# Patient Record
Sex: MALE | Race: WHITE | NOT HISPANIC OR LATINO | Employment: OTHER | URBAN - METROPOLITAN AREA
[De-identification: names, ages, dates, MRNs, and addresses within clinical notes are randomized per-mention and may not be internally consistent; named-entity substitution may affect disease eponyms.]

---

## 2017-12-05 ENCOUNTER — APPOINTMENT (OUTPATIENT)
Dept: PREADMISSION TESTING | Facility: HOSPITAL | Age: 77
End: 2017-12-05

## 2017-12-05 VITALS
HEART RATE: 60 BPM | WEIGHT: 195 LBS | DIASTOLIC BLOOD PRESSURE: 95 MMHG | BODY MASS INDEX: 27.92 KG/M2 | OXYGEN SATURATION: 96 % | TEMPERATURE: 98.3 F | RESPIRATION RATE: 16 BRPM | HEIGHT: 70 IN | SYSTOLIC BLOOD PRESSURE: 152 MMHG

## 2017-12-05 LAB
ALBUMIN SERPL-MCNC: 4.3 G/DL (ref 3.5–5.2)
ALBUMIN/GLOB SERPL: 1.3 G/DL
ALP SERPL-CCNC: 68 U/L (ref 39–117)
ALT SERPL W P-5'-P-CCNC: 28 U/L (ref 1–41)
ANION GAP SERPL CALCULATED.3IONS-SCNC: 7.3 MMOL/L
AST SERPL-CCNC: 21 U/L (ref 1–40)
BILIRUB SERPL-MCNC: 0.6 MG/DL (ref 0.1–1.2)
BUN BLD-MCNC: 14 MG/DL (ref 8–23)
BUN/CREAT SERPL: 13.9 (ref 7–25)
CALCIUM SPEC-SCNC: 9.2 MG/DL (ref 8.6–10.5)
CHLORIDE SERPL-SCNC: 103 MMOL/L (ref 98–107)
CO2 SERPL-SCNC: 29.7 MMOL/L (ref 22–29)
CREAT BLD-MCNC: 1.01 MG/DL (ref 0.76–1.27)
DEPRECATED RDW RBC AUTO: 46.8 FL (ref 37–54)
ERYTHROCYTE [DISTWIDTH] IN BLOOD BY AUTOMATED COUNT: 13.5 % (ref 11.5–14.5)
GFR SERPL CREATININE-BSD FRML MDRD: 72 ML/MIN/1.73
GLOBULIN UR ELPH-MCNC: 3.2 GM/DL
GLUCOSE BLD-MCNC: 109 MG/DL (ref 65–99)
HCT VFR BLD AUTO: 49.5 % (ref 40.4–52.2)
HGB BLD-MCNC: 17.1 G/DL (ref 13.7–17.6)
MCH RBC QN AUTO: 33 PG (ref 27–32.7)
MCHC RBC AUTO-ENTMCNC: 34.5 G/DL (ref 32.6–36.4)
MCV RBC AUTO: 95.6 FL (ref 79.8–96.2)
PLATELET # BLD AUTO: 183 10*3/MM3 (ref 140–500)
PMV BLD AUTO: 10.2 FL (ref 6–12)
POTASSIUM BLD-SCNC: 4.5 MMOL/L (ref 3.5–5.2)
PROT SERPL-MCNC: 7.5 G/DL (ref 6–8.5)
RBC # BLD AUTO: 5.18 10*6/MM3 (ref 4.6–6)
SODIUM BLD-SCNC: 140 MMOL/L (ref 136–145)
WBC NRBC COR # BLD: 4.9 10*3/MM3 (ref 4.5–10.7)

## 2017-12-05 PROCEDURE — 80053 COMPREHEN METABOLIC PANEL: CPT | Performed by: SURGERY

## 2017-12-05 PROCEDURE — 85027 COMPLETE CBC AUTOMATED: CPT | Performed by: SURGERY

## 2017-12-05 PROCEDURE — 36415 COLL VENOUS BLD VENIPUNCTURE: CPT

## 2017-12-05 PROCEDURE — 93010 ELECTROCARDIOGRAM REPORT: CPT | Performed by: INTERNAL MEDICINE

## 2017-12-05 PROCEDURE — 93005 ELECTROCARDIOGRAM TRACING: CPT

## 2017-12-05 NOTE — DISCHARGE INSTRUCTIONS
Take the following medications the morning of surgery with a small sip of water:  NONE    ARRIVE AT 0600.        General Instructions:  • Do not eat solid food after midnight the night before surgery.  • You may drink clear liquids day of surgery but must stop at least one hour before your hospital arrival time.  • It is beneficial for you to have a clear drink that contains carbohydrates the day of surgery.  We suggest a 12 to 20 ounce bottle of Gatorade or Powerade for non-diabetic patients or a 12 to 20 ounce bottle of G2 or Powerade Zero for diabetic patients. (Pediatric patients, are not advised to drink a 12 to 20 ounce carbohydrate drink)    Clear liquids are liquids you can see through.  Nothing red in color.     Plain water                               Sports drinks  Sodas                                   Gelatin (Jell-O)  Fruit juices without pulp such as white grape juice and apple juice  Popsicles that contain no fruit or yogurt  Tea or coffee (no cream or milk added)  Gatorade / Powerade  G2 / Powerade Zero    • Infants may have breast milk up to four hours before surgery.  • Infants drinking formula may drink formula up to six hours before surgery.   • Patients who avoid smoking, chewing tobacco and alcohol for 4 weeks prior to surgery have a reduced risk of post-operative complications.  Quit smoking as many days before surgery as you can.  • Do not smoke, use chewing tobacco or drink alcohol the day of surgery.   • If applicable bring your C-PAP/ BI-PAP machine.  • Bring any papers given to you in the doctor’s office.  • Wear clean comfortable clothes and socks.  • Do not wear contact lenses or make-up.  Bring a case for your glasses.   • Bring crutches or walker if applicable.  • Remove all piercings.  Leave jewelry and any other valuables at home.  • The Pre-Admission Testing nurse will instruct you to bring medications if unable to obtain an accurate list in Pre-Admission Testing.        If you  were given a blood bank ID arm band remember to bring it with you the day of surgery.    Preventing a Surgical Site Infection:  • For 2 to 3 days before surgery, avoid shaving with a razor because the razor can irritate skin and make it easier to develop an infection.  • The night prior to surgery sleep in a clean bed with clean clothing.  Do not allow pets to sleep with you.  • Shower on the morning of surgery using a fresh bar of anti-bacterial soap (such as Dial) and clean washcloth.  Dry with a clean towel and dress in clean clothing.  • Ask your surgeon if you will be receiving antibiotics prior to surgery.  • Make sure you, your family, and all healthcare providers clean their hands with soap and water or an alcohol based hand  before caring for you or your wound.    Day of surgery:  Upon arrival, a Pre-op nurse and Anesthesiologist will review your health history, obtain vital signs, and answer questions you may have.  The only belongings needed at this time will be your home medications and if applicable your C-PAP/BI-PAP machine.  If you are staying overnight your family can leave the rest of your belongings in the car and bring them to your room later.  A Pre-op nurse will start an IV and you may receive medication in preparation for surgery, including something to help you relax.  Your family will be able to see you in the Pre-op area.  While you are in surgery your family should notify the waiting room  if they leave the waiting room area and provide a contact phone number.    Please be aware that surgery does come with discomfort.  We want to make every effort to control your discomfort so please discuss any uncontrolled symptoms with your nurse.   Your doctor will most likely have prescribed pain medications.      If you are going home after surgery you will receive individualized written care instructions before being discharged.  A responsible adult must drive you to and from the  hospital on the day of your surgery and stay with you for 24 hours.    If you are staying overnight following surgery, you will be transported to your hospital room following the recovery period.  [facility] has all private rooms.    If you have any questions please call Pre-Admission Testing at [phone].  Deductibles and co-payments are collected on the day of service. Please be prepared to pay the required co-pay, deductible or deposit on the day of service as defined by your plan.

## 2017-12-12 ENCOUNTER — ANESTHESIA EVENT (OUTPATIENT)
Dept: PERIOP | Facility: HOSPITAL | Age: 77
End: 2017-12-12

## 2017-12-12 ENCOUNTER — ANESTHESIA (OUTPATIENT)
Dept: PERIOP | Facility: HOSPITAL | Age: 77
End: 2017-12-12

## 2017-12-12 ENCOUNTER — HOSPITAL ENCOUNTER (OUTPATIENT)
Facility: HOSPITAL | Age: 77
Setting detail: HOSPITAL OUTPATIENT SURGERY
Discharge: HOME | End: 2017-12-12
Attending: SURGERY | Admitting: SURGERY

## 2017-12-12 VITALS
DIASTOLIC BLOOD PRESSURE: 91 MMHG | HEART RATE: 65 BPM | TEMPERATURE: 97.2 F | WEIGHT: 195 LBS | BODY MASS INDEX: 28.11 KG/M2 | RESPIRATION RATE: 16 BRPM | SYSTOLIC BLOOD PRESSURE: 146 MMHG | OXYGEN SATURATION: 97 %

## 2017-12-12 PROCEDURE — 25010000002 EPINEPHRINE PER 0.1 MG: Performed by: SURGERY

## 2017-12-12 PROCEDURE — C1781 MESH (IMPLANTABLE): HCPCS | Performed by: SURGERY

## 2017-12-12 PROCEDURE — 25010000002 KETOROLAC TROMETHAMINE PER 15 MG: Performed by: NURSE ANESTHETIST, CERTIFIED REGISTERED

## 2017-12-12 PROCEDURE — 25010000002 FENTANYL CITRATE (PF) 100 MCG/2ML SOLUTION: Performed by: NURSE ANESTHETIST, CERTIFIED REGISTERED

## 2017-12-12 PROCEDURE — 25010000003 CEFAZOLIN IN DEXTROSE 2-4 GM/100ML-% SOLUTION: Performed by: NURSE ANESTHETIST, CERTIFIED REGISTERED

## 2017-12-12 PROCEDURE — 25010000002 PROPOFOL 10 MG/ML EMULSION: Performed by: NURSE ANESTHETIST, CERTIFIED REGISTERED

## 2017-12-12 PROCEDURE — 25010000002 MIDAZOLAM PER 1 MG: Performed by: ANESTHESIOLOGY

## 2017-12-12 PROCEDURE — 25010000002 ONDANSETRON PER 1 MG: Performed by: NURSE ANESTHETIST, CERTIFIED REGISTERED

## 2017-12-12 DEVICE — VENTRALEX ST HERNIA PATCH
Type: IMPLANTABLE DEVICE | Site: UMBILICAL | Status: FUNCTIONAL
Brand: VENTRALEX ST HERNIA PATCH

## 2017-12-12 RX ORDER — ASPIRIN 81 MG/1
81 TABLET ORAL DAILY
COMMUNITY
End: 2019-10-17

## 2017-12-12 RX ORDER — IBUPROFEN 600 MG/1
600 TABLET ORAL EVERY 8 HOURS
Qty: 15 TABLET | Refills: 0 | Status: SHIPPED | OUTPATIENT
Start: 2017-12-12 | End: 2017-12-17

## 2017-12-12 RX ORDER — SODIUM CHLORIDE, SODIUM LACTATE, POTASSIUM CHLORIDE, CALCIUM CHLORIDE 600; 310; 30; 20 MG/100ML; MG/100ML; MG/100ML; MG/100ML
9 INJECTION, SOLUTION INTRAVENOUS CONTINUOUS
Status: DISCONTINUED | OUTPATIENT
Start: 2017-12-12 | End: 2017-12-12 | Stop reason: HOSPADM

## 2017-12-12 RX ORDER — HYDRALAZINE HYDROCHLORIDE 20 MG/ML
5 INJECTION INTRAMUSCULAR; INTRAVENOUS
Status: DISCONTINUED | OUTPATIENT
Start: 2017-12-12 | End: 2017-12-12 | Stop reason: HOSPADM

## 2017-12-12 RX ORDER — HYDROCODONE BITARTRATE AND ACETAMINOPHEN 7.5; 325 MG/1; MG/1
1 TABLET ORAL ONCE AS NEEDED
Status: DISCONTINUED | OUTPATIENT
Start: 2017-12-12 | End: 2017-12-12 | Stop reason: HOSPADM

## 2017-12-12 RX ORDER — EPHEDRINE SULFATE 50 MG/ML
5 INJECTION, SOLUTION INTRAVENOUS ONCE AS NEEDED
Status: DISCONTINUED | OUTPATIENT
Start: 2017-12-12 | End: 2017-12-12 | Stop reason: HOSPADM

## 2017-12-12 RX ORDER — LABETALOL HYDROCHLORIDE 5 MG/ML
5 INJECTION, SOLUTION INTRAVENOUS
Status: DISCONTINUED | OUTPATIENT
Start: 2017-12-12 | End: 2017-12-12 | Stop reason: HOSPADM

## 2017-12-12 RX ORDER — KETOROLAC TROMETHAMINE 30 MG/ML
INJECTION, SOLUTION INTRAMUSCULAR; INTRAVENOUS AS NEEDED
Status: DISCONTINUED | OUTPATIENT
Start: 2017-12-12 | End: 2017-12-12 | Stop reason: SURG

## 2017-12-12 RX ORDER — OXYCODONE HYDROCHLORIDE AND ACETAMINOPHEN 5; 325 MG/1; MG/1
2 TABLET ORAL ONCE AS NEEDED
Status: DISCONTINUED | OUTPATIENT
Start: 2017-12-12 | End: 2017-12-12 | Stop reason: HOSPADM

## 2017-12-12 RX ORDER — ONDANSETRON 2 MG/ML
4 INJECTION INTRAMUSCULAR; INTRAVENOUS ONCE AS NEEDED
Status: DISCONTINUED | OUTPATIENT
Start: 2017-12-12 | End: 2017-12-12 | Stop reason: HOSPADM

## 2017-12-12 RX ORDER — SODIUM CHLORIDE 0.9 % (FLUSH) 0.9 %
1-10 SYRINGE (ML) INJECTION AS NEEDED
Status: DISCONTINUED | OUTPATIENT
Start: 2017-12-12 | End: 2017-12-12 | Stop reason: HOSPADM

## 2017-12-12 RX ORDER — FENTANYL CITRATE 50 UG/ML
50 INJECTION, SOLUTION INTRAMUSCULAR; INTRAVENOUS
Status: DISCONTINUED | OUTPATIENT
Start: 2017-12-12 | End: 2017-12-12 | Stop reason: HOSPADM

## 2017-12-12 RX ORDER — FENTANYL CITRATE 50 UG/ML
100 INJECTION, SOLUTION INTRAMUSCULAR; INTRAVENOUS
Status: DISCONTINUED | OUTPATIENT
Start: 2017-12-12 | End: 2017-12-12 | Stop reason: HOSPADM

## 2017-12-12 RX ORDER — OXYCODONE AND ACETAMINOPHEN 7.5; 325 MG/1; MG/1
1 TABLET ORAL EVERY 6 HOURS PRN
Qty: 20 TABLET | Refills: 0 | Status: SHIPPED | OUTPATIENT
Start: 2017-12-12 | End: 2017-12-17

## 2017-12-12 RX ORDER — MAGNESIUM HYDROXIDE 1200 MG/15ML
LIQUID ORAL AS NEEDED
Status: DISCONTINUED | OUTPATIENT
Start: 2017-12-12 | End: 2017-12-12 | Stop reason: HOSPADM

## 2017-12-12 RX ORDER — FLUMAZENIL 0.1 MG/ML
0.2 INJECTION INTRAVENOUS AS NEEDED
Status: DISCONTINUED | OUTPATIENT
Start: 2017-12-12 | End: 2017-12-12 | Stop reason: HOSPADM

## 2017-12-12 RX ORDER — LIDOCAINE HYDROCHLORIDE 10 MG/ML
0.5 INJECTION, SOLUTION EPIDURAL; INFILTRATION; INTRACAUDAL; PERINEURAL ONCE AS NEEDED
Status: COMPLETED | OUTPATIENT
Start: 2017-12-12 | End: 2017-12-12

## 2017-12-12 RX ORDER — LIDOCAINE HYDROCHLORIDE 20 MG/ML
INJECTION, SOLUTION INFILTRATION; PERINEURAL AS NEEDED
Status: DISCONTINUED | OUTPATIENT
Start: 2017-12-12 | End: 2017-12-12 | Stop reason: SURG

## 2017-12-12 RX ORDER — FAMOTIDINE 10 MG/ML
20 INJECTION, SOLUTION INTRAVENOUS ONCE
Status: COMPLETED | OUTPATIENT
Start: 2017-12-12 | End: 2017-12-12

## 2017-12-12 RX ORDER — FENTANYL CITRATE 50 UG/ML
INJECTION, SOLUTION INTRAMUSCULAR; INTRAVENOUS AS NEEDED
Status: DISCONTINUED | OUTPATIENT
Start: 2017-12-12 | End: 2017-12-12 | Stop reason: SURG

## 2017-12-12 RX ORDER — PROPOFOL 10 MG/ML
VIAL (ML) INTRAVENOUS AS NEEDED
Status: DISCONTINUED | OUTPATIENT
Start: 2017-12-12 | End: 2017-12-12 | Stop reason: SURG

## 2017-12-12 RX ORDER — DIPHENHYDRAMINE HYDROCHLORIDE 50 MG/ML
6.25 INJECTION INTRAMUSCULAR; INTRAVENOUS
Status: DISCONTINUED | OUTPATIENT
Start: 2017-12-12 | End: 2017-12-12 | Stop reason: HOSPADM

## 2017-12-12 RX ORDER — CEFAZOLIN SODIUM 2 G/100ML
INJECTION, SOLUTION INTRAVENOUS AS NEEDED
Status: DISCONTINUED | OUTPATIENT
Start: 2017-12-12 | End: 2017-12-12 | Stop reason: SURG

## 2017-12-12 RX ORDER — MIDAZOLAM HYDROCHLORIDE 1 MG/ML
1 INJECTION INTRAMUSCULAR; INTRAVENOUS
Status: DISCONTINUED | OUTPATIENT
Start: 2017-12-12 | End: 2017-12-12 | Stop reason: HOSPADM

## 2017-12-12 RX ORDER — MIDAZOLAM HYDROCHLORIDE 1 MG/ML
2 INJECTION INTRAMUSCULAR; INTRAVENOUS
Status: DISCONTINUED | OUTPATIENT
Start: 2017-12-12 | End: 2017-12-12 | Stop reason: HOSPADM

## 2017-12-12 RX ORDER — SODIUM PHOSPHATE,MONO-DIBASIC 19G-7G/118
2 ENEMA (ML) RECTAL DAILY
COMMUNITY

## 2017-12-12 RX ORDER — ROCURONIUM BROMIDE 10 MG/ML
INJECTION, SOLUTION INTRAVENOUS AS NEEDED
Status: DISCONTINUED | OUTPATIENT
Start: 2017-12-12 | End: 2017-12-12 | Stop reason: SURG

## 2017-12-12 RX ORDER — ONDANSETRON 2 MG/ML
INJECTION INTRAMUSCULAR; INTRAVENOUS AS NEEDED
Status: DISCONTINUED | OUTPATIENT
Start: 2017-12-12 | End: 2017-12-12 | Stop reason: SURG

## 2017-12-12 RX ADMIN — ONDANSETRON 4 MG: 2 INJECTION INTRAMUSCULAR; INTRAVENOUS at 08:57

## 2017-12-12 RX ADMIN — CEFAZOLIN SODIUM 2 G: 2 INJECTION, SOLUTION INTRAVENOUS at 08:32

## 2017-12-12 RX ADMIN — KETOROLAC TROMETHAMINE 30 MG: 30 INJECTION, SOLUTION INTRAMUSCULAR; INTRAVENOUS at 09:17

## 2017-12-12 RX ADMIN — LIDOCAINE HYDROCHLORIDE 100 MG: 20 INJECTION, SOLUTION INFILTRATION; PERINEURAL at 08:32

## 2017-12-12 RX ADMIN — Medication 2 MG: at 08:24

## 2017-12-12 RX ADMIN — FAMOTIDINE 20 MG: 10 INJECTION, SOLUTION INTRAVENOUS at 07:26

## 2017-12-12 RX ADMIN — SUGAMMADEX 150 MG: 100 INJECTION, SOLUTION INTRAVENOUS at 09:15

## 2017-12-12 RX ADMIN — FENTANYL CITRATE 50 MCG: 50 INJECTION INTRAMUSCULAR; INTRAVENOUS at 08:32

## 2017-12-12 RX ADMIN — ROCURONIUM BROMIDE 40 MG: 10 INJECTION INTRAVENOUS at 08:32

## 2017-12-12 RX ADMIN — LIDOCAINE HYDROCHLORIDE 0.5 ML: 10 INJECTION, SOLUTION EPIDURAL; INFILTRATION; INTRACAUDAL; PERINEURAL at 06:58

## 2017-12-12 RX ADMIN — PROPOFOL 150 MG: 10 INJECTION, EMULSION INTRAVENOUS at 08:32

## 2017-12-12 RX ADMIN — SODIUM CHLORIDE, POTASSIUM CHLORIDE, SODIUM LACTATE AND CALCIUM CHLORIDE 9 ML/HR: 600; 310; 30; 20 INJECTION, SOLUTION INTRAVENOUS at 06:58

## 2017-12-12 RX ADMIN — FENTANYL CITRATE 50 MCG: 50 INJECTION INTRAMUSCULAR; INTRAVENOUS at 09:10

## 2017-12-12 NOTE — ANESTHESIA POSTPROCEDURE EVALUATION
Patient: Sanket Davis    Procedure Summary     Date Anesthesia Start Anesthesia Stop Room / Location    12/12/17 0828 0925  [facility]  /  [facility]       Procedure Diagnosis Surgeon Provider    UMBILICAL HERNIA REPAIR W/ MESH (N/A Abdomen) No diagnosis on file. MD Kj De La Vega MD          Anesthesia Type: general  Last vitals  BP   146/91 (12/12/17 1044)   Temp   36.2 °C (97.2 °F) (12/12/17 1000)   Pulse   65 (12/12/17 1044)   Resp   16 (12/12/17 1044)     SpO2   97 % (12/12/17 1044)     Post Anesthesia Care and Evaluation    Patient location during evaluation: bedside  Patient participation: complete - patient participated  Level of consciousness: awake  Pain score: 2  Pain management: adequate  Airway patency: patent  Anesthetic complications: No anesthetic complications  PONV Status: none  Cardiovascular status: acceptable  Respiratory status: acceptable  Hydration status: acceptable    Comments: /91 (BP Location: Left arm, Patient Position: Lying)  Pulse 65  Temp 36.2 °C (97.2 °F) (Temporal Artery )   Resp 16  Wt 88.5 kg (195 lb)  SpO2 97%  BMI 28.11 kg/m2

## 2017-12-12 NOTE — ANESTHESIA PROCEDURE NOTES
Airway  Urgency: elective    Date/Time: 12/12/2017 8:35 AM  Airway not difficult    General Information and Staff    Patient location during procedure: OR  Anesthesiologist: FANI FARRIS  CRNA: YE ROUSSEAU    Indications and Patient Condition  Indications for airway management: airway protection    Preoxygenated: yes  MILS maintained throughout  Mask difficulty assessment: 2 - vent by mask + OA or adjuvant +/- NMBA    Final Airway Details  Final airway type: endotracheal airway      Successful airway: ETT  Cuffed: yes   Successful intubation technique: direct laryngoscopy  Facilitating devices/methods: intubating stylet  Endotracheal tube insertion site: oral  Blade: Alice  Blade size: #4  ETT size: 7.5 mm  Cormack-Lehane Classification: grade IIa - partial view of glottis  Placement verified by: chest auscultation and capnometry   Cuff volume (mL): 7  Measured from: lips  ETT to lips (cm): 22  Number of attempts at approach: 1    Additional Comments  Patient in OR. Monitors on. BLVS. Pre 02 100%. SIVI. DL x1. DVVC. Atraumatic placement of ETT. Placement verified with ETC02 and BBS. ETT secured. Teeth/lips in pre-op condition.

## 2017-12-12 NOTE — H&P
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 He first noticed a bulge approximately 10 years ago. It has been growing in size. He does not have pain associated with it.    The following portions of the patient's history were reviewed and updated as appropriate: allergies, current medications, past family history, past medical history, past social history, past surgical history and problem list.    Past Medical History:   Diagnosis Date   • Allergy history unknown   seasonal   • Arthritis   OA   • Bunion of right foot   • Hyperlipidemia 2016   • PUD (peptic ulcer disease)   AGE 27   • Statin intolerance 2016   MUSCLE ACHES   • Thumb anomaly   R THUMB   • White coat syndrome with hypertension       Past Surgical History:   Procedure Laterality Date   • EYE SURGERY Bilateral 2002   [facility] in [facility]   • JOINT REPLACEMENT Left 2005   DR PATEL   • TONSILLECTOMY 1947     Current Medications:   Current Outpatient Prescriptions   Medication Sig Dispense Refill   • aspirin 81 MG tablet Take 81 mg by mouth daily   • Glucos-Chondroit-Hyaluron-MSM (GLUCOSAMINE CHONDROITIN JOINT PO) Take by mouth   • Multiple Vitamins-Minerals (MULTIVITAMIN ADULTS PO) Take by mouth     No current facility-administered medications for this visit.     Allergies: Statins     Review of Systems   Constitutional: Negative for chills, fever, malaise/fatigue and weight loss.   HENT: Negative for hearing loss and sore throat.   Eyes: Negative for blurred vision.   Respiratory: Negative for cough and shortness of breath.   Cardiovascular: Negative for chest pain, palpitations and leg swelling.   Gastrointestinal: Negative for abdominal pain, blood in stool, constipation, diarrhea, heartburn, melena, nausea and vomiting.   Genitourinary: Negative for dysuria, frequency and urgency.  "  Musculoskeletal: Negative for back pain and joint pain.   Skin: Negative for rash.   Neurological: Negative for dizziness, weakness and headaches.   Endo/Heme/Allergies: Does not bruise/bleed easily.   Psychiatric/Behavioral: Negative for depression and memory loss.   All other systems reviewed and are negative.    Family History   Problem Relation Age of Onset   • Cancer, Other or Unknown Type Mother   breast   • Diabetes Mother 91   • Hyperlipidemia Mother   • Stroke Mother 88   • Heart disease Father     Social  Social History     Social History   • Marital status: Unknown   Spouse name: N/A   • Number of children: N/A   • Years of education: N/A     Social History Main Topics   • Smoking status: Former Smoker   Packs/day: 1.50   Years: 24.00   Start date: 1/1/1973   Quit date: 1/1/1988   • Smokeless tobacco: Never Used   • Alcohol use 1.8 oz/week   3 Cans of beer per week   Comment: 3 beers per day   • Drug use: No   • Sexual activity: Yes   Partners: Female   Comment: -2 KIDS/2 STEP KIDS-OWN BUISNESS     Other Topics Concern   • None     Social History Narrative       Physical Examination:   /86 (BP Location: Left arm, Orthostatic Position: Sitting)  Pulse 70  Ht 5' 10\" (1.778 m)  Wt 195 lb (88.5 kg)  SpO2 98%  BMI 27.98 kg/m2    General - well developed  male, no distress, appears stated age.  HEENT - pupils are equal, conjunctiva are pink, sclera are non-icteric.  Mouth - mucous membranes are moist, speech is normal with no hoarseness.  Neck - supple, no adenopathy or masses, no JVD. The thyroid is not palpable  Chest - Normal, comfortable respiratory effort. No accessory muscle use. clear.no rhonchi. No rales.  Heart - regular rate and rhythm. No murmur, no gallop  Abdomen - soft, non-distended, no masses. No hepatosplenomegaly, non-tender,   Hernia is present. Reducible umbilical hernia, 3 cm defect in the fascia  Ext - no edema or cyanosis, capillary refill is brisk.  Skin - " warm to touch, no diaphoresis. No jaundice  Gait is normal  Speech and memory seem normal    Impression:  Umbilical hernia, easily reducible, asymptomatic    Plan:  At this time, due to the size of the hernia and the risk of obstruction, I recommend an umbilical hernia repair. The indications, alternatives, risks, and benefits were explained to the patient in detail and the patient wishes to proceed.    CC: Ron Savage MD Overley, David J, MD

## 2017-12-12 NOTE — OP NOTE
Umbilical hernia repair with mesh      Name: Sanket Davis  Age: 77 y.o.  Sex: male  :  1940  MRN: [id number]    Umbilical Herniorrhaphy  Procedure Report    Pre-operative Diagnosis : Umbilical hernia     Post-operative Diagnosis: same     Surgeon: Raymond Cevallos MD     Asst:  Madhuri Saucedo CFA    Findings/Complications: Umbilical hernia     Description of procedure: The patient was taken to the operating room. Prepping and drape were performed after induction of general endotracheal anesthesia. An incision was made through the umbilicus. The medium  hernia sac was dissected and amputated. Omentum was reduced back into the peritoneal cavity.     A  large  Bard Ventralex ST mesh patch was dipped into saline then introduced into the peritoneal cavity. Straps were used to secure the mesh up against the anterior abdominal wall. Interrupted mattress sutures of heavy Vicryl were used to anchor this skirted portion of the mesh to the abdominal fascia. The defect itself was then closed with interrupted heavy Vicryl suture. The skin incision was closed with a running nylon suture. Gauze dressings were applied.  He  tolerated the procedure well and was taken to the recovery area in stable condition.     Specimens: None     Estimated Blood Loss: Minimal     Condition : Good     Drains: None       The assistant was present from the start of the case until the last stitch was placed.  The assistant helped with providing exposure, use of cautery and other measures for hemostasis, suctioning, and placing ties when appropriate.

## 2017-12-12 NOTE — PLAN OF CARE
Problem: Patient Care Overview (Adult)  Goal: Plan of Care Review  Outcome: Outcome(s) achieved Date Met:  12/12/17 12/12/17 1029   Coping/Psychosocial Response Interventions   Plan Of Care Reviewed With patient   Patient Care Overview   Progress improving       Goal: Discharge Needs Assessment  Outcome: Outcome(s) achieved Date Met:  12/12/17 12/12/17 1029   Discharge Needs Assessment   Concerns To Be Addressed denies needs/concerns at this time   Discharge Disposition home or self-care   Living Environment   Transportation Available family or friend will provide;car         Problem: Perioperative Period (Adult)  Goal: Signs and Symptoms of Listed Potential Problems Will be Absent or Manageable (Perioperative Period)  Outcome: Outcome(s) achieved Date Met:  12/12/17 12/12/17 1029   Perioperative Period   Problems Assessed (Perioperative Period) all   Problems Present (Perioperative Period) none

## 2017-12-12 NOTE — PLAN OF CARE
Problem: Patient Care Overview (Adult)  Goal: Plan of Care Review  Outcome: Ongoing (interventions implemented as appropriate)    12/12/17 0641   Coping/Psychosocial Response Interventions   Plan Of Care Reviewed With patient   Patient Care Overview   Progress improving       Goal: Discharge Needs Assessment  Outcome: Ongoing (interventions implemented as appropriate)    12/12/17 0641   Discharge Needs Assessment   Concerns To Be Addressed denies needs/concerns at this time         Problem: Perioperative Period (Adult)  Goal: Signs and Symptoms of Listed Potential Problems Will be Absent or Manageable (Perioperative Period)  Outcome: Ongoing (interventions implemented as appropriate)    12/12/17 0641   Perioperative Period   Problems Assessed (Perioperative Period) all

## 2019-12-03 ENCOUNTER — OFFICE VISIT (OUTPATIENT)
Dept: ORTHOPEDIC SURGERY | Facility: CLINIC | Age: 79
End: 2019-12-03

## 2019-12-03 VITALS — WEIGHT: 191.4 LBS | BODY MASS INDEX: 27.4 KG/M2 | TEMPERATURE: 98 F | HEIGHT: 70 IN

## 2019-12-03 DIAGNOSIS — S46.011A TRAUMATIC COMPLETE TEAR OF RIGHT ROTATOR CUFF, INITIAL ENCOUNTER: Primary | ICD-10-CM

## 2019-12-03 PROCEDURE — 99204 OFFICE O/P NEW MOD 45 MIN: CPT | Performed by: ORTHOPAEDIC SURGERY

## 2019-12-03 RX ORDER — IBUPROFEN 600 MG/1
TABLET ORAL
COMMUNITY

## 2019-12-03 NOTE — PROGRESS NOTES
New right Shoulder      Patient: Sanket Davis        YOB: 1940    Medical Record Number: [id number]        Chief Complaints: right shoulder pain      History of Present Illness: This is a 79-year-old male who presents complaining of right shoulder pain he is right-hand dominant he states he fell on 10/17/2019 loading things onto a trailer he has significant night pain symptoms are moderate intermittent aching worse with activity somewhat better with anti-inflammatories he is an auctioneer realtor and farmer past medical history smart for stomach ulcers      Allergies: No Known Allergies    Medications:   Home Medications:  Current Outpatient Medications on File Prior to Visit   Medication Sig   • glucosamine-chondroitin 500-400 MG capsule capsule Take 2 capsules by mouth Daily.   • Multiple Vitamins-Minerals (MULTIVITAMIN ADULT PO) Take 1 tablet by mouth Daily.     No current facility-administered medications on file prior to visit.      Current Medications:  Scheduled Meds:  Continuous Infusions:  No current facility-administered medications for this visit.   PRN Meds:.    Past Medical History:   Diagnosis Date   • Umbilical hernia         Past Surgical History:   Procedure Laterality Date   • HIP ARTHROPLASTY     • JOINT REPLACEMENT     • TONSILLECTOMY     • UMBILICAL HERNIA REPAIR N/A 2017    Procedure: UMBILICAL HERNIA REPAIR W/ MESH;  Surgeon: Raymond Cevallos MD;  Location: [facility];  Service:         Social History     Occupational History   • Not on file   Tobacco Use   • Smoking status: Former Smoker     Years: 40.00     Types: Cigarettes     Last attempt to quit: 1997     Years since quittin.0   • Smokeless tobacco: Never Used   Substance and Sexual Activity   • Alcohol use: Yes     Comment: TWO DAILY   • Drug use: No   • Sexual activity: Not on file    Social History     Social History Narrative   • Not on file        Family History   Problem Relation Age of Onset  "  • Malig Hyperthermia Neg Hx              Review of Systems: 14 point review of systems are remarkable for pertinent positives listed in the chart by the patient the remainder negative    Review of Systems      Physical Exam: 79 y.o. male  General Appearance:    Alert, cooperative, in no acute distress                 There were no vitals filed for this visit.   Patient is alert and read ×3 no acute distress appears her above-listed at height weight and age.  Affect is normal respiratory rate is normal unlabored. Heart rate regular rate rhythm, sclera, dentition and hearing are normal for the purpose of this exam.    Ortho Exam his exam of his right shoulder he can only actively flex to about 100 he has struggles with this abduction similar external rotation to 30 rotator cuff strength is 4/5 with Iceman strength testing he has marked \"limping\" with any active shoulder range of motion.  He has no overlying skin changes he is neurologically intact good distal pulses  Procedures          Radiology:   AP, Scapular Y and Axillary Lateral of the right shoulder were ordered/reviewed to evauate shoulder pain.  I did review these from another facility does have some degenerative changes at the glenohumeral joint more significantly of the acromioclavicular joint.  He also comes with an MRI which shows a full-thickness tear of the rotator cuff involving the supra and infraspinatus and subscapularis he has some rotator cuff muscle atrophy and infiltration he has a subluxation of his biceps tendon medially I have reviewed the above and agree with the findings  Imaging Results (Most Recent)     None        Assessment/Plan: Right shoulder pain I think this is a long-standing rotator cuff tear which she was functioning just fine with until his recent injury.  This injury really changed his function this is not a repairable tear plan is to have him see Dr. Pressley for most likely reverse arthroplasty  "

## 2021-03-17 ENCOUNTER — CLAIMS CREATED FROM THE CLAIM WINDOW (OUTPATIENT)
Dept: URBAN - METROPOLITAN AREA CLINIC 40 | Facility: CLINIC | Age: 81
End: 2021-03-17
Payer: COMMERCIAL

## 2021-03-17 ENCOUNTER — WEB ENCOUNTER (OUTPATIENT)
Dept: URBAN - METROPOLITAN AREA CLINIC 40 | Facility: CLINIC | Age: 81
End: 2021-03-17

## 2021-03-17 DIAGNOSIS — R14.0 BLOATING AND GAS: ICD-10-CM

## 2021-03-17 DIAGNOSIS — Z87.19 S/P SMALL BOWEL OBSTRUCTION: ICD-10-CM

## 2021-03-17 DIAGNOSIS — Z86.010 PERSONAL HISTORY OF COLON POLYPS: ICD-10-CM

## 2021-03-17 DIAGNOSIS — Z90.49 S/P SMALL BOWEL RESECTION: ICD-10-CM

## 2021-03-17 PROCEDURE — 99203 OFFICE O/P NEW LOW 30 MIN: CPT | Performed by: PHYSICIAN ASSISTANT

## 2021-03-17 RX ORDER — CARVEDILOL 6.25 MG/1
(PRIOR AUTH#:000007128444) TABLET, FILM COATED ORAL
Qty: 180 DELAYED RELEASE TABLET | Status: ACTIVE | COMMUNITY

## 2021-03-17 RX ORDER — TRAZODONE HYDROCHLORIDE 50 MG/1
(PRIOR AUTH#:000007130560) TABLET ORAL
Qty: 90 DELAYED RELEASE TABLET | Status: ACTIVE | COMMUNITY

## 2021-03-17 RX ORDER — ATORVASTATIN CALCIUM 40 MG/1
(PRIOR AUTH#:000007119801) TABLET, FILM COATED ORAL
Qty: 90 DELAYED RELEASE TABLET | Status: ACTIVE | COMMUNITY

## 2021-03-17 RX ORDER — APIXABAN 5 MG/1
(PRIOR AUTH#:000007126266) TABLET, FILM COATED ORAL
Qty: 60 DELAYED RELEASE TABLET | Status: ACTIVE | COMMUNITY

## 2021-03-17 RX ORDER — FINASTERIDE 5 MG/1
(PRIOR AUTH#:000007127218) TABLET, FILM COATED ORAL
Qty: 30 DELAYED RELEASE TABLET | Status: ACTIVE | COMMUNITY

## 2021-03-17 RX ORDER — TAMSULOSIN HYDROCHLORIDE 0.4 MG/1
(PRIOR AUTH#:000007125997) CAPSULE ORAL
Qty: 90 CAP | Status: ACTIVE | COMMUNITY

## 2021-03-17 NOTE — HPI-TODAY'S VISIT:
Mr. Ivey is a 80 year old Black male who presents to clinic after last visit in 2015 with complaint of general abdominal gas/bloat pain since his bowel surgery and hospitalization for cardiac surgery earlier this year.  Denies constipation or diarrhea.  No rectal bleeding or melena.  Remains on Eliquis daily after cardiovascular surgery.  Denies nausea, vomiting.  Has a good and preserved appetite but states he is not gaining the weight that he would like.  Eating regular diet.  Fairly balanced diet reportedly.  Maintains that gas bothers him most and he has begun taking Gas-X recently which has been mildly effective.  [facility] our last colonoscopy with him but is unable to tell me the results.  We do not have any records to review today.  [facility] which he was told should be completed this year. Mr. Ivey had a colonoscopy back on September 23, 2015 with Dr. Ji where he large 35 mm polyp was found in the ascending colon.  It was mildly depressed in some areas and semi-sessile in others.  This was biopsied and tattooed.  He also had internal hemorrhoids on exam.  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  The patient was supposed to have resection of polyp through the [facility]. He has a history of colon polyps.  It was recommended he follow-up with surgery for complete resection of the lesion.  He has had recent blood work back on March 1st with mildly decreased albumin of 3.6 alkaline phosphatase of 128 with normal AST, ALT and bilirubin.  Had osteomyelitis in the last several months. Hemoglobin low at 9.4 hematocrit 29.4 with normal MCV of 96.  Platelets low 120 3K.  Normal sedimentation rate and CRP.  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  History of prostate cancer.  History of CAD with PCI in 2011 2012, hypertension, hyperlipidemia, DVT on Eliquis.  Vertebral osteomyelitis and discitis.  COVID-19 - January 2, 2021 as well as back in December and July. Recent small-bowel obstruction. This was secondary to internal hernia secondary to adhesion with contused bowel. S/p diagnostic laparoscopy, laparoscopic lysis of adhesions, open small bowel resection and reduction of internal hernia by Dr. Cason.

## 2021-03-17 NOTE — PHYSICAL EXAM GASTROINTESTINAL
Abdomen , soft, nontender, nondistended , no guarding or rigidity , no masses palpable , normal bowel sounds , Liver and Spleen , no hepatomegaly present , no hepatosplenomegaly , liver nontender , spleen not palpable. Healing surgical scars.

## 2021-03-18 ENCOUNTER — TELEPHONE ENCOUNTER (OUTPATIENT)
Dept: URBAN - METROPOLITAN AREA CLINIC 40 | Facility: CLINIC | Age: 81
End: 2021-03-18

## 2021-03-19 ENCOUNTER — TELEPHONE ENCOUNTER (OUTPATIENT)
Dept: URBAN - METROPOLITAN AREA CLINIC 98 | Facility: CLINIC | Age: 81
End: 2021-03-19

## 2021-03-29 ENCOUNTER — OFFICE VISIT (OUTPATIENT)
Dept: URBAN - METROPOLITAN AREA TELEHEALTH 2 | Facility: TELEHEALTH | Age: 81
End: 2021-03-29
Payer: COMMERCIAL

## 2021-03-29 DIAGNOSIS — Z90.49 S/P SMALL BOWEL RESECTION: ICD-10-CM

## 2021-03-29 DIAGNOSIS — R14.0 BLOATING AND GAS: ICD-10-CM

## 2021-03-29 DIAGNOSIS — Z86.010 PERSONAL HISTORY OF COLON POLYPS: ICD-10-CM

## 2021-03-29 DIAGNOSIS — D69.6 THROMBOCYTOPENIA: ICD-10-CM

## 2021-03-29 PROCEDURE — 99213 OFFICE O/P EST LOW 20 MIN: CPT | Performed by: INTERNAL MEDICINE

## 2021-03-29 RX ORDER — CARVEDILOL 6.25 MG/1
(PRIOR AUTH#:000007128444) TABLET, FILM COATED ORAL
Qty: 180 DELAYED RELEASE TABLET | Status: ACTIVE | COMMUNITY

## 2021-03-29 RX ORDER — ATORVASTATIN CALCIUM 40 MG/1
(PRIOR AUTH#:000007119801) TABLET, FILM COATED ORAL
Qty: 90 DELAYED RELEASE TABLET | Status: ACTIVE | COMMUNITY

## 2021-03-29 RX ORDER — TAMSULOSIN HYDROCHLORIDE 0.4 MG/1
(PRIOR AUTH#:000007125997) CAPSULE ORAL
Qty: 90 CAP | Status: ACTIVE | COMMUNITY

## 2021-03-29 RX ORDER — FINASTERIDE 5 MG/1
(PRIOR AUTH#:000007127218) TABLET, FILM COATED ORAL
Qty: 30 DELAYED RELEASE TABLET | Status: ACTIVE | COMMUNITY

## 2021-03-29 RX ORDER — APIXABAN 5 MG/1
(PRIOR AUTH#:000007126266) TABLET, FILM COATED ORAL
Qty: 60 DELAYED RELEASE TABLET | Status: ACTIVE | COMMUNITY

## 2021-03-29 RX ORDER — TRAZODONE HYDROCHLORIDE 50 MG/1
(PRIOR AUTH#:000007130560) TABLET ORAL
Qty: 90 DELAYED RELEASE TABLET | Status: ACTIVE | COMMUNITY

## 2021-04-02 ENCOUNTER — TELEPHONE ENCOUNTER (OUTPATIENT)
Dept: URBAN - METROPOLITAN AREA CLINIC 98 | Facility: CLINIC | Age: 81
End: 2021-04-02

## 2021-04-07 ENCOUNTER — TELEPHONE ENCOUNTER (OUTPATIENT)
Dept: URBAN - METROPOLITAN AREA CLINIC 96 | Facility: CLINIC | Age: 81
End: 2021-04-07

## 2021-04-15 ENCOUNTER — OFFICE VISIT (OUTPATIENT)
Dept: URBAN - METROPOLITAN AREA CLINIC 40 | Facility: CLINIC | Age: 81
End: 2021-04-15
Payer: COMMERCIAL

## 2021-04-15 DIAGNOSIS — Z90.49 S/P SMALL BOWEL RESECTION: ICD-10-CM

## 2021-04-15 DIAGNOSIS — R14.0 BLOATING AND GAS: ICD-10-CM

## 2021-04-15 DIAGNOSIS — Z87.19 S/P SMALL BOWEL OBSTRUCTION: ICD-10-CM

## 2021-04-15 DIAGNOSIS — K76.89 HEPATIC CYST: ICD-10-CM

## 2021-04-15 DIAGNOSIS — R74.8 ELEVATED ALKALINE PHOSPHATASE LEVEL: ICD-10-CM

## 2021-04-15 DIAGNOSIS — N28.1 RENAL CYSTS, ACQUIRED, BILATERAL: ICD-10-CM

## 2021-04-15 DIAGNOSIS — Z86.010 PERSONAL HISTORY OF COLON POLYPS: ICD-10-CM

## 2021-04-15 DIAGNOSIS — D69.6 THROMBOCYTOPENIA: ICD-10-CM

## 2021-04-15 DIAGNOSIS — K80.20 CHOLELITHIASIS: ICD-10-CM

## 2021-04-15 DIAGNOSIS — M54.9 BACK PAIN: ICD-10-CM

## 2021-04-15 PROBLEM — 347861000119105: Status: ACTIVE | Noted: 2021-03-19

## 2021-04-15 PROBLEM — 85057007 HEPATIC CYST: Status: ACTIVE | Noted: 2021-04-15

## 2021-04-15 PROBLEM — 428283002 HISTORY OF POLYP OF COLON: Status: ACTIVE | Noted: 2021-03-19

## 2021-04-15 PROBLEM — 266474003 CHOLELITHIASIS: Status: ACTIVE | Noted: 2021-04-15

## 2021-04-15 PROBLEM — 302215000 THROMBOCYTOPENIA: Status: ACTIVE | Noted: 2021-03-29

## 2021-04-15 PROCEDURE — 99213 OFFICE O/P EST LOW 20 MIN: CPT | Performed by: PHYSICIAN ASSISTANT

## 2021-04-15 RX ORDER — ATORVASTATIN CALCIUM 40 MG/1
(PRIOR AUTH#:000007119801) TABLET, FILM COATED ORAL
Qty: 90 DELAYED RELEASE TABLET | Status: ACTIVE | COMMUNITY

## 2021-04-15 RX ORDER — TAMSULOSIN HYDROCHLORIDE 0.4 MG/1
(PRIOR AUTH#:000007125997) CAPSULE ORAL
Qty: 90 CAP | Status: ACTIVE | COMMUNITY

## 2021-04-15 RX ORDER — TRAZODONE HYDROCHLORIDE 50 MG/1
(PRIOR AUTH#:000007130560) TABLET ORAL
Qty: 90 DELAYED RELEASE TABLET | Status: ACTIVE | COMMUNITY

## 2021-04-15 RX ORDER — APIXABAN 5 MG/1
(PRIOR AUTH#:000007126266) TABLET, FILM COATED ORAL
Qty: 60 DELAYED RELEASE TABLET | Status: ACTIVE | COMMUNITY

## 2021-04-15 RX ORDER — FINASTERIDE 5 MG/1
(PRIOR AUTH#:000007127218) TABLET, FILM COATED ORAL
Qty: 30 DELAYED RELEASE TABLET | Status: ACTIVE | COMMUNITY

## 2021-04-15 RX ORDER — CARVEDILOL 6.25 MG/1
(PRIOR AUTH#:000007128444) TABLET, FILM COATED ORAL
Qty: 180 DELAYED RELEASE TABLET | Status: ACTIVE | COMMUNITY

## 2021-04-15 NOTE — PHYSICAL EXAM HENT:
Head , normocephalic , atraumatic , Face , Face within normal limits , Ears , External ears within normal limits

## 2021-04-15 NOTE — HPI-TODAY'S VISIT:
Mr. Ivey is a 80 year old Black male who presents to clinic after last visit  3/29/21 with complaint of general abdominal gas/bloat pain since his bowel surgery and hospitalization for cardiac surgery earlier this year.  Denies constipation or diarrhea.  No rectal bleeding or melena.  Remains on Eliquis daily after cardiovascular surgery.  Denies nausea, vomiting.  Has a good and preserved appetite but states he is not gaining the weight that he would like.  Eating regular diet.  Fairly balanced diet reportedly.  Maintains that gas bothers him most and he has begun taking Gas-X recently which has been mildly effective.  [facility] since our last colonoscopy with him but is unable to tell me the results.  We do not have any records to review today. Mr. Ivey had a colonoscopy back on September 23, 2015 with Dr. Ji where he large 35 mm polyp was found in the ascending colon.  It was mildly depressed in some areas and semi-sessile in others.  This was biopsied and tattooed.  He also had internal hemorrhoids on exam.  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  He has a history of colon polyps.  It was recommended he follow-up with surgery for complete resection of the lesion.  Had osteomyelitis in the last several months. 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  History of prostate cancer.  History of CAD with PCI in 2011- 2012, hypertension, hyperlipidemia, DVT on Eliquis.  Vertebral osteomyelitis and discitis.  COVID-19 - January 2, 2021 as well as back in December and July. Recent small-bowel obstruction. This was secondary to internal hernia secondary to adhesion with contused bowel. S/p diagnostic laparoscopy, laparoscopic lysis of adhesions, open small bowel resection and reduction of internal hernia by Dr. Cason. Normal Abd Xray 3/17/21, ordered by our office. Reports normal colonoscopy through [facility] in 2020. Reportedly abnormal LFTs. Blood work forwarded to our office which was completed on March 1, 2021 with findings of normal transaminases but mildly elevated alk phos of 128.  Albumin 3.6.  CBC with hemoglobin low of 9.4 hematocrit 29.4 and normal MCV.  Platelets 123k.  Normal sedimentation rate and CRP.  From a CT of the chest without IV contrast which I reviewed completed on January 2, 2021, he had findings of hypodense lesions in the right hepatic lobe and left hepatic lobe of the dome of the liver which was unchanged.  A CT of the abdomen and pelvis back in 2020 with findings of small amount of ascites in the setting of a bowel obstruction prior to his surgery.  Several small hepatic cysts with unremarkable gallbladder noted.  He was also having some new nonspecific right flank pain, now resolved. Occasional LLQ discomfort.  Denies nausea, vomiting, fever or chills.  Believes his gas/ bloat symptoms have improved. Taking probiotic.  Continues to take a probiotic daily since he is had his bowel surgery earlier in the year.  No recent imaging of the liver, specifically. Denies personal history of hepatitis, no previous testing for hepatitis. He did have a recent complete abdominal ultrasound 4/12/21 which noted multiple gallstones without cholecystitis, to which he remains asymptomatic. CBD normal. Also large renal cysts, bilaterally ranging from 8.6 to 10 cm. Recent abdominal Xray with findings of some retained stool. No obstruction or other abnormality. He has a new PCP/NP who he is seeing locally. No other complaints today. He has not completed CLD, viral hepatitis panel labs ordered last visit.

## 2021-08-16 ENCOUNTER — DASHBOARD ENCOUNTERS (OUTPATIENT)
Age: 81
End: 2021-08-16

## 2021-08-19 ENCOUNTER — OFFICE VISIT (OUTPATIENT)
Dept: URBAN - METROPOLITAN AREA CLINIC 40 | Facility: CLINIC | Age: 81
End: 2021-08-19

## 2021-08-19 RX ORDER — TRAZODONE HYDROCHLORIDE 50 MG/1
(PRIOR AUTH#:000007130560) TABLET ORAL
Qty: 90 DELAYED RELEASE TABLET | Status: ACTIVE | COMMUNITY

## 2021-08-19 RX ORDER — FINASTERIDE 5 MG/1
(PRIOR AUTH#:000007127218) TABLET, FILM COATED ORAL
Qty: 30 DELAYED RELEASE TABLET | Status: ACTIVE | COMMUNITY

## 2021-08-19 RX ORDER — ATORVASTATIN CALCIUM 40 MG/1
(PRIOR AUTH#:000007119801) TABLET, FILM COATED ORAL
Qty: 90 DELAYED RELEASE TABLET | Status: ACTIVE | COMMUNITY

## 2021-08-19 RX ORDER — APIXABAN 5 MG/1
(PRIOR AUTH#:000007126266) TABLET, FILM COATED ORAL
Qty: 60 DELAYED RELEASE TABLET | Status: ACTIVE | COMMUNITY

## 2021-08-19 RX ORDER — CARVEDILOL 6.25 MG/1
(PRIOR AUTH#:000007128444) TABLET, FILM COATED ORAL
Qty: 180 DELAYED RELEASE TABLET | Status: ACTIVE | COMMUNITY

## 2021-08-19 RX ORDER — TAMSULOSIN HYDROCHLORIDE 0.4 MG/1
(PRIOR AUTH#:000007125997) CAPSULE ORAL
Qty: 90 CAP | Status: ACTIVE | COMMUNITY

## 2023-08-09 NOTE — PROGRESS NOTES
[facility]      Patient: Sanket Davis        YOB: 1940    Medical Record Number: [id number]        Chief Complaints: Left shoulder pain      History of Present Illness: This is a 82-year-old male that I last saw in 2019 for his right shoulder he presents complaining of left shoulder pains been ongoing for 2 weeks no history injury change in activity he is right-hand dominant he does have night pain has pain with activity he has had a right total shoulder arthroplasty is doing great from that.  His past medical history is remarkable for hernia      Allergies: No Known Allergies    Medications:   Home Medications:  Current Outpatient Medications on File Prior to Visit   Medication Sig    glucosamine-chondroitin 500-400 MG capsule capsule Take 2 capsules by mouth Daily.    ibuprofen (ADVIL,MOTRIN) 600 MG tablet ibuprofen 600 mg tablet    Multiple Vitamins-Minerals (MULTIVITAMIN ADULT PO) Take 1 tablet by mouth Daily.     No current facility-administered medications on file prior to visit.     Current Medications:  Scheduled Meds:  Continuous Infusions:No current facility-administered medications for this visit.    PRN Meds:.    Past Medical History:   Diagnosis Date    Umbilical hernia         Past Surgical History:   Procedure Laterality Date    HIP ARTHROPLASTY      JOINT REPLACEMENT      SHOULDER SURGERY      TONSILLECTOMY      UMBILICAL HERNIA REPAIR N/A 2017    Procedure: UMBILICAL HERNIA REPAIR W/ MESH;  Surgeon: Raymond Cevallos MD;  Location: [facility] OR [facility] [place];  Service:         Social History     Occupational History    Not on file   Tobacco Use    Smoking status: Former     Years: 40.00     Types: Cigarettes     Quit date: 1997     Years since quittin.6    Smokeless tobacco: Never   Vaping Use    Vaping Use: Never used   Substance and Sexual Activity    Alcohol use: Yes     Comment: TWO DAILY    Drug use: No    Sexual activity: Defer      Social History  "    Social History Narrative    Not on file        Family History   Problem Relation Age of Onset    Heart disease Father     Maldimas Hyperthermia Neg Hx              Review of Systems:     Review of Systems      Physical Exam: 82 y.o. male  General Appearance:    Alert, cooperative, in no acute distress                   Vitals:    08/10/23 1412   Temp: 97.9 øF (36.6 øC)   Weight: 86.5 kg (190 lb 12.8 oz)   Height: 177.8 cm (70\")   PainSc:   3      Patient is alert and read x3 no acute distress appears her above-listed at height weight and age.  Affect is normal respiratory rate is normal unlabored. Heart rate regular rate rhythm, sclera, dentition and hearing are normal for the purpose of this exam.    Ortho Exam  Physical exam of the right shoulder reveals no overlying skin changes no lymphedema no lymphadenopathy.  Patient has active flexion 180 with mild symptoms abduction is similar external rotation is to 50 and internal rotation to the upper lumbar spine with mild symptoms.  Patient has good rotator cuff strength 4+ over 5 with isometric strength testing with pain.  Patient has a positive impingement and a positive Peters sign.  Patient has good cervical range of motion which is full and asymptomatic no radicular symptoms.  Patient has a normal elbow exam.  Good distal pulses are present  Patient has pain with overhead activity and a positive Neer sign and a positive empty can sign , a positive drop arm and a definitive painful arc   Large Joint Arthrocentesis: L subacromial bursa  Date/Time: 8/10/2023 2:35 PM  Consent given by: patient  Site marked: site marked  Timeout: Immediately prior to procedure a time out was called to verify the correct patient, procedure, equipment, support staff and site/side marked as required   Supporting Documentation  Indications: pain   Procedure Details  Location: shoulder - L subacromial bursa  Preparation: Patient was prepped and draped in the usual sterile " fashion  Needle gauge: 21G.  Approach: posterior  Medications administered: 80 mg methylPREDNISolone acetate 80 MG/ML; 2 mL lidocaine PF 1% 1 %  Patient tolerance: patient tolerated the procedure well with no immediate complications            Radiology:   AP, Scapular Y and Axillary Lateral of the right shoulder were ordered/reviewed to evauate shoulder pain.  I have no comparative films of the left shoulder he does have some acromioclavicular arthritis otherwise no acute pathology  Imaging Results (Most Recent)       Procedure Component Value Units Date/Time    XR Shoulder 2+ View Left [[id number]] Resulted: 08/10/23 1353     Updated: 08/10/23 1353    Impression:      Ordering physician's impression is located in the Encounter Note dated 08/10/23. X-ray performed in the DR room.            Assessment/Plan: Left shoulder pain I do think this is rotator cuff in origin hopefully just impingement which I discussed with him in detail plan is to proceed with an injection as a diagnostic and therapeutic tool I will have him get back on some the exercises that he knows we did discuss those should he fail this regimen we will pursue other means of testing  Cortisone Injection. See procedure note.  Cortisone Injection for DIAGNOSTIC and THERAPUTIC purposes.

## 2023-08-10 ENCOUNTER — OFFICE VISIT (OUTPATIENT)
Dept: ORTHOPEDIC SURGERY | Facility: CLINIC | Age: 83
End: 2023-08-10
Payer: MEDICARE

## 2023-08-10 VITALS — WEIGHT: 190.8 LBS | HEIGHT: 70 IN | TEMPERATURE: 97.9 F | BODY MASS INDEX: 27.32 KG/M2

## 2023-08-10 DIAGNOSIS — M75.42 IMPINGEMENT SYNDROME OF LEFT SHOULDER: ICD-10-CM

## 2023-08-10 DIAGNOSIS — M25.512 LEFT SHOULDER PAIN, UNSPECIFIED CHRONICITY: Primary | ICD-10-CM

## 2023-08-10 RX ADMIN — METHYLPREDNISOLONE ACETATE 80 MG: 80 INJECTION, SUSPENSION INTRA-ARTICULAR; INTRALESIONAL; INTRAMUSCULAR; SOFT TISSUE at 14:35

## 2023-08-10 RX ADMIN — LIDOCAINE HYDROCHLORIDE 2 ML: 10 INJECTION, SOLUTION EPIDURAL; INFILTRATION; INTRACAUDAL; PERINEURAL at 14:35

## 2023-08-11 RX ORDER — METHYLPREDNISOLONE ACETATE 80 MG/ML
80 INJECTION, SUSPENSION INTRA-ARTICULAR; INTRALESIONAL; INTRAMUSCULAR; SOFT TISSUE
Status: COMPLETED | OUTPATIENT
Start: 2023-08-10 | End: 2023-08-10

## 2023-08-11 RX ORDER — LIDOCAINE HYDROCHLORIDE 10 MG/ML
2 INJECTION, SOLUTION EPIDURAL; INFILTRATION; INTRACAUDAL; PERINEURAL
Status: COMPLETED | OUTPATIENT
Start: 2023-08-10 | End: 2023-08-10

## 2023-09-25 PROBLEM — E78.5 HYPERLIPIDEMIA: Status: ACTIVE | Noted: 2017-08-04

## 2023-09-25 PROBLEM — K46.9 ABDOMINAL HERNIA: Status: ACTIVE | Noted: 2017-12-28

## 2023-09-25 PROBLEM — K42.9 UMBILICAL HERNIA WITHOUT OBSTRUCTION AND WITHOUT GANGRENE: Status: ACTIVE | Noted: 2017-08-04

## 2023-09-25 PROBLEM — Z96.611 S/P REVERSE TOTAL SHOULDER ARTHROPLASTY, RIGHT: Status: ACTIVE | Noted: 2020-06-23

## 2023-09-25 PROBLEM — M15.9 GENERALIZED OA: Status: ACTIVE | Noted: 2017-08-04

## 2023-09-25 PROBLEM — M12.811 ARTHROPATHY, TRANSIENT, SHOULDER, RIGHT: Status: ACTIVE | Noted: 2020-01-29

## 2023-09-25 PROBLEM — N40.2 PROSTATE NODULE: Status: ACTIVE | Noted: 2017-08-04

## 2023-09-25 PROBLEM — R35.1 NOCTURIA: Status: ACTIVE | Noted: 2017-08-04

## 2023-10-10 ENCOUNTER — OFFICE VISIT (OUTPATIENT)
Dept: ORTHOPEDIC SURGERY | Facility: CLINIC | Age: 83
End: 2023-10-10
Payer: MEDICARE

## 2023-10-10 VITALS — TEMPERATURE: 97.5 F | WEIGHT: 188.6 LBS | HEIGHT: 71 IN | BODY MASS INDEX: 26.4 KG/M2

## 2023-10-10 DIAGNOSIS — M75.122 COMPLETE TEAR OF LEFT ROTATOR CUFF, UNSPECIFIED WHETHER TRAUMATIC: Primary | ICD-10-CM

## 2023-10-10 PROCEDURE — 99213 OFFICE O/P EST LOW 20 MIN: CPT | Performed by: ORTHOPAEDIC SURGERY

## 2023-10-10 NOTE — PROGRESS NOTES
Patient: Sanket Davis  YOB: 1940  Date of Service: 10/10/2023    Chief Complaints: Left shoulder pain  left  Subjective:    History of Present Illness: Pt is seen in the office today with complaints of left shoulder pain we have treated this conservatively with no lasting improvement I think the neck step would be an MRI        Allergies: No Known Allergies    Medications:   Home Medications:  Current Outpatient Medications on File Prior to Visit   Medication Sig    cefdinir (OMNICEF) 300 MG capsule Take 1 po bid    glucosamine-chondroitin 500-400 MG capsule capsule Take 2 capsules by mouth Daily.    Multiple Vitamins-Minerals (MULTIVITAMIN ADULT PO) Take 1 tablet by mouth Daily.    predniSONE (DELTASONE) 20 MG tablet Take 3 tablets po every morning    Triamcinolone Acetonide (NASACORT) 55 MCG/ACT nasal inhaler 1 spray into the nostril(s) as directed by provider Daily.     No current facility-administered medications on file prior to visit.     Current Medications:  Scheduled Meds:  Continuous Infusions:No current facility-administered medications for this visit.    PRN Meds:.    I have reviewed the patient's medical history in detail and updated the computerized patient record.  Review and summarization of old records include:    Past Medical History:   Diagnosis Date    Umbilical hernia         Past Surgical History:   Procedure Laterality Date    HIP ARTHROPLASTY      JOINT REPLACEMENT      SHOULDER SURGERY      TONSILLECTOMY      UMBILICAL HERNIA REPAIR N/A 2017    Procedure: UMBILICAL HERNIA REPAIR W/ MESH;  Surgeon: Raymond Cevallos MD;  [place] [facility] [place] [facility] [place]:         Social History     Occupational History    Not on file   Tobacco Use    Smoking status: Former     Years: 40     Types: Cigarettes     Quit date: 1997     Years since quittin.8     Passive exposure: Past    Smokeless tobacco: Never   Vaping Use    Vaping Use: Never used   Substance and Sexual  Activity    Alcohol use: Yes     Comment: TWO DAILY    Drug use: No    Sexual activity: Defer      Social History     Social History Narrative    Not on file        Family History   Problem Relation Age of Onset    Heart disease Father     Maldimas Hyperthermia Neg Hx        ROS: 14 point review of systems was performed and was negative except for documented findings in HPI and today's encounter.     Allergies: No Known Allergies  Constitutional:  Denies fever, shaking or chills   Eyes:  Denies change in visual acuity   HENT:  Denies nasal congestion or sore throat   Respiratory:  Denies cough or shortness of breath   Cardiovascular:  Denies chest pain or severe LE edema   GI:  Denies abdominal pain, nausea, vomiting, bloody stools or diarrhea   Musculoskeletal:  Numbness, tingling, or loss of motor function only as noted above in history of present illness.  : Denies painful urination or hematuria  Integument:  Denies rash, lesion or ulceration   Neurologic:  Denies headache or focal weakness  Endocrine:  Denies lymphadenopathy  Psych:  Denies confusion or change in mental status   Hem:  Denies active bleeding      Physical Exam: 82 y.o. male  Wt Readings from Last 3 Encounters:   08/10/23 86.5 kg (190 lb 12.8 oz)   12/03/19 86.8 kg (191 lb 6.4 oz)   10/17/19 82.6 kg (182 lb)       There is no height or weight on file to calculate BMI.    There were no vitals filed for this visit.  Vital signs reviewed.   General Appearance:    Alert, cooperative, in no acute distress                    Ortho exam    Physical exam of the left shoulder reveals no overlying skin changes no lymphedema no lymphadenopathy.  Patient has active flexion 180 with mild symptoms abduction is similar external rotation is to 50 and internal rotation to the upper lumbar spine with mild symptoms.  Patient has good rotator cuff strength 4+ over 5 with isometric strength testing with pain.  Patient has a positive impingement and a positive Peters  sign.  Patient has good cervical range of motion which is full and asymptomatic no radicular symptoms.  Patient has a normal elbow exam.  Good distal pulses are presentPatient has pain with overhead activity and a positive Neer sign and a positive empty can sign  They have a positive drop arm any definitive painful arc              Assessment: Persistent left shoulder pain despite conservative measures I am concerned about a full-thickness rotator cuff tear plan is to proceed with an MRI    Plan:   Follow up as indicated.  Ice, elevate, and rest as needed.  Discussed conservative measures of pain control including ice, bracing.  Also talked about the importance of strengthening and maintaining ideal body weight    Shyanne Membreno M.D.

## 2023-10-19 ENCOUNTER — TELEPHONE (OUTPATIENT)
Dept: ORTHOPEDIC SURGERY | Facility: CLINIC | Age: 83
End: 2023-10-19
Payer: MEDICARE

## 2023-10-19 NOTE — TELEPHONE ENCOUNTER
Pt informed of results and voiced understanding.  Follow up appt was scheduled for him to see ARTIS on 10/26, he is aware this will be at our EP location.

## 2023-10-19 NOTE — TELEPHONE ENCOUNTER
----- Message from MARISSA Prescott sent at 10/19/2023  3:07 PM EDT -----  Please let him know that Dr. Membreno is out today but I have reviewed his MRI. He does not have a rotator cuff tear but does have tendinitis of the cuff. There are some changes in the bicep tendon at the labrum which could be causing symptoms and he has some inflammation in the capsule that surrounds the joint- this can sometimes signify the beginnings of frozen shoulder.    I suspect she would like to see him back to discuss this further, perhaps try injecting in a different area with some cortisone to see if we can get him relief in his symptoms. Nothing on this MRI would indicate he needs surgery right now, but could be an option if we can't improve with additional conservative care.

## 2023-10-24 NOTE — PROGRESS NOTES
Shoulder MRI Follow Up      Patient: Sanket Davis        YOB: 1940            Chief Complaints: Shoulder pain left      History of Present Illness: The patient is here follow-up of an MRI of the shoulder MRI demonstrates acromioclavicular arthritis he does have supra and infraspinatus tendinosis without a tear he has subacromial bursitis some mild scapula and subscapularis tendinosis and some fraying of the biceps anchor he does have minimal glenohumeral effusion and evidence of capsular inflammation.  I seen him previously and did a subacromial injection which did not give him a lot of relief we talked about today of doing a glenohumeral injection which I think is quite reasonable      Physical Exam: 82 y.o. male  General Appearance:    Alert, cooperative, in no acute distress                 There were no vitals filed for this visit.     Patient is alert and read ×3 no acute distress appears her above-listed at height weight and age.  Affect is normal respiratory rate is normal unlabored. Heart rate regular rate rhythm, sclera, dentition and hearing are normal for the purpose of this exam.      Ortho Exam  Physical exam of the left shoulder reveals no overlying skin changes no lymphedema no lymphadenopathy.  Patient has active flexion 180 with mild symptoms abduction is similar external rotation is to 50 and internal rotation to the lower lumbar spine with mild symptoms.  Patient has good rotator cuff strength 4+ over 5 with isometric strength testing with pain.  Patient has a positive impingement and a positive Peters sign.  Patient has good cervical range of motion which is full and asymptomatic no radicular symptoms.  Patient has a normal elbow exam.  Good distal pulses are presentPatient has pain with overhead activity and a positive Neer sign and a positive empty can sign  They have a positive drop arm any definitive painful arc     MRI Results: MRIs as above have reviewed the films myself  and agree with findings  Large Joint Arthrocentesis: L glenohumeral  Date/Time: 10/26/2023 8:33 AM  Consent given by: patient  Site marked: site marked  Timeout: Immediately prior to procedure a time out was called to verify the correct patient, procedure, equipment, support staff and site/side marked as required   Supporting Documentation  Indications: pain   Procedure Details  Location: shoulder - L glenohumeral  Preparation: Patient was prepped and draped in the usual sterile fashion  Needle gauge: 21G.  Approach: posterior  Medications administered: 1 mL methylPREDNISolone acetate 80 MG/ML; 2 mL lidocaine PF 1% 1 %  Patient tolerance: patient tolerated the procedure well with no immediate complications        Assessment/Plan:      Left shoulder pain some of this can be capsular inflammation and think is reasonable to try glenohumeral injection especially since he did not get any relief from subacromial

## 2023-10-26 ENCOUNTER — OFFICE VISIT (OUTPATIENT)
Dept: ORTHOPEDIC SURGERY | Facility: CLINIC | Age: 83
End: 2023-10-26
Payer: MEDICARE

## 2023-10-26 VITALS — TEMPERATURE: 98.3 F | WEIGHT: 188 LBS | BODY MASS INDEX: 26.32 KG/M2 | HEIGHT: 71 IN

## 2023-10-26 DIAGNOSIS — M77.9 CAPSULITIS: ICD-10-CM

## 2023-10-26 DIAGNOSIS — M75.42 IMPINGEMENT SYNDROME OF LEFT SHOULDER: Primary | ICD-10-CM

## 2023-10-26 RX ADMIN — METHYLPREDNISOLONE ACETATE 1 ML: 80 INJECTION, SUSPENSION INTRA-ARTICULAR; INTRALESIONAL; INTRAMUSCULAR; SOFT TISSUE at 08:33

## 2023-10-26 RX ADMIN — LIDOCAINE HYDROCHLORIDE 2 ML: 10 INJECTION, SOLUTION EPIDURAL; INFILTRATION; INTRACAUDAL; PERINEURAL at 08:33

## 2023-10-29 RX ORDER — LIDOCAINE HYDROCHLORIDE 10 MG/ML
2 INJECTION, SOLUTION EPIDURAL; INFILTRATION; INTRACAUDAL; PERINEURAL
Status: COMPLETED | OUTPATIENT
Start: 2023-10-26 | End: 2023-10-26

## 2023-10-29 RX ORDER — METHYLPREDNISOLONE ACETATE 80 MG/ML
1 INJECTION, SUSPENSION INTRA-ARTICULAR; INTRALESIONAL; INTRAMUSCULAR; SOFT TISSUE
Status: COMPLETED | OUTPATIENT
Start: 2023-10-26 | End: 2023-10-26

## 2023-11-14 DIAGNOSIS — M75.122 COMPLETE TEAR OF LEFT ROTATOR CUFF, UNSPECIFIED WHETHER TRAUMATIC: ICD-10-CM

## 2023-11-30 ENCOUNTER — OFFICE VISIT (OUTPATIENT)
Dept: ORTHOPEDIC SURGERY | Facility: CLINIC | Age: 83
End: 2023-11-30
Payer: MEDICARE

## 2023-11-30 VITALS — WEIGHT: 183 LBS | TEMPERATURE: 98.2 F | BODY MASS INDEX: 25.62 KG/M2 | HEIGHT: 71 IN

## 2023-11-30 DIAGNOSIS — M75.112 INCOMPLETE TEAR OF LEFT ROTATOR CUFF, UNSPECIFIED WHETHER TRAUMATIC: ICD-10-CM

## 2023-11-30 DIAGNOSIS — M19.012 PRIMARY LOCALIZED OSTEOARTHROSIS OF LEFT SHOULDER REGION: Primary | ICD-10-CM

## 2023-11-30 PROCEDURE — 99213 OFFICE O/P EST LOW 20 MIN: CPT | Performed by: ORTHOPAEDIC SURGERY

## 2023-11-30 NOTE — PROGRESS NOTES
"Shoulder Follow Up      Patient: Sanket Davis        YOB: 1940            Chief Complaints: Shoulder pain left      History of Present Illness: Patient is here follow-up shoulder pain we did do an MRI which shows some glenohumeral arthritis as well as some partial tearing the rotator cuff we have tried a subacromial injection which did not give him much relief the glenohumeral injection did help him we did that about 6 weeks ago he is happy with where he is at this time      Physical Exam: 83 y.o. male  General Appearance:    Alert, cooperative, in no acute distress                   Vitals:    11/30/23 1132   Temp: 98.2 °F (36.8 °C)   TempSrc: Temporal   Weight: 83 kg (183 lb)   Height: 180.3 cm (71\")   PainSc:   3        Patient is alert and read ×3 no acute distress appears her above-listed at height weight and age.  Affect is normal respiratory rate is normal unlabored. Heart rate regular rate rhythm, sclera, dentition and hearing are normal for the purpose of this exam.      Ortho Exam    Physical exam of the left shoulder reveals no overlying skin changes no lymphedema no lymphadenopathy.  Patient has active flexion 180 with mild symptoms abduction is similar external rotation is to 50 and internal rotation to the upper lumbar spine with mild symptoms.  Patient has good rotator cuff strength 4+ over 5 with isometric strength testing with pain.  Patient has a positive impingement and a positive Peters sign.  Patient has good cervical range of motion which is full and asymptomatic no radicular symptoms.  Patient has a normal elbow exam.  Good distal pulses are presentPatient has pain with overhead activity and a positive Neer sign and a positive empty can sign  They have a positive drop arm any definitive painful arc           Assessment/Plan: Left shoulder pain he does have some degenerative changes also tendinopathy of the rotator cuff and partial tearing he responded better to it " glenohumeral injection right now is doing fine he understands he can repeat the injection in February he will continue strengthening and activity modification    He does tell me occasionally has some right arm numbness we talked about cardiac issues he states he has no chest pain at all I told him does not have to for to be cardiac in origin and what he is describing I agree it does not sound cardiac but I told him I wanted him to check with his primary care

## 2024-01-26 ENCOUNTER — CLINICAL SUPPORT (OUTPATIENT)
Dept: ORTHOPEDIC SURGERY | Facility: CLINIC | Age: 84
End: 2024-01-26
Payer: MEDICARE

## 2024-01-26 VITALS — BODY MASS INDEX: 25.87 KG/M2 | TEMPERATURE: 98.3 F | WEIGHT: 191 LBS | HEIGHT: 72 IN

## 2024-01-26 DIAGNOSIS — M19.019 GLENOHUMERAL ARTHRITIS: Primary | ICD-10-CM

## 2024-01-26 RX ORDER — METHYLPREDNISOLONE ACETATE 80 MG/ML
1 INJECTION, SUSPENSION INTRA-ARTICULAR; INTRALESIONAL; INTRAMUSCULAR; SOFT TISSUE
Status: COMPLETED | OUTPATIENT
Start: 2024-01-26 | End: 2024-01-26

## 2024-01-26 RX ORDER — LIDOCAINE HYDROCHLORIDE 20 MG/ML
2 INJECTION, SOLUTION EPIDURAL; INFILTRATION; INTRACAUDAL; PERINEURAL
Status: COMPLETED | OUTPATIENT
Start: 2024-01-26 | End: 2024-01-26

## 2024-01-26 RX ADMIN — LIDOCAINE HYDROCHLORIDE 2 ML: 20 INJECTION, SOLUTION EPIDURAL; INFILTRATION; INTRACAUDAL; PERINEURAL at 08:14

## 2024-01-26 RX ADMIN — METHYLPREDNISOLONE ACETATE 1 ML: 80 INJECTION, SUSPENSION INTRA-ARTICULAR; INTRALESIONAL; INTRAMUSCULAR; SOFT TISSUE at 08:14

## 2024-02-28 NOTE — PROGRESS NOTES
Patient: Sanket Davis  YOB: 1940  Date of Service: 2024    Chief Complaints: Left shoulder pain    Subjective:    History of Present Illness: Pt is seen in the office today with complaints of left shoulder pain he has an MRI which shows some tendinopathy of the rotator cuff and then a type I SLAP tear I done a couple glenohumeral injections he got good relief he states he was playing golf a few weeks ago and after the golf round had severe pain in the left shoulder with inability to forward flex        Allergies: No Known Allergies    Medications:   Home Medications:  Current Outpatient Medications on File Prior to Visit   Medication Sig    glucosamine-chondroitin 500-400 MG capsule capsule Take 2 capsules by mouth Daily.    Multiple Vitamins-Minerals (MULTIVITAMIN ADULT PO) Take 1 tablet by mouth Daily.     No current facility-administered medications on file prior to visit.     Current Medications:  Scheduled Meds:  Continuous Infusions:No current facility-administered medications for this visit.    PRN Meds:.    I have reviewed the patient's medical history in detail and updated the computerized patient record.  Review and summarization of old records include:    Past Medical History:   Diagnosis Date    Umbilical hernia         Past Surgical History:   Procedure Laterality Date    HIP ARTHROPLASTY      JOINT REPLACEMENT      SHOULDER SURGERY      TONSILLECTOMY      UMBILICAL HERNIA REPAIR N/A 2017    Procedure: UMBILICAL HERNIA REPAIR W/ MESH;  Surgeon: Raymond Cevallos MD;  Location: [facility] OR [facility];  Service:         Social History     Occupational History    Not on file   Tobacco Use    Smoking status: Former     Years: 40     Types: Cigarettes     Quit date: 1997     Years since quittin.2     Passive exposure: Past    Smokeless tobacco: Never   Vaping Use    Vaping Use: Never used   Substance and Sexual Activity    Alcohol use: Yes     Comment: TWO DAILY    Drug use: No     Sexual activity: Defer      Social History     Social History Narrative    Not on file        Family History   Problem Relation Age of Onset    Heart disease Father     Steve Hyperthermia Neg Hx        ROS: 14 point review of systems was performed and was negative except for documented findings in HPI and today's encounter.     Allergies: No Known Allergies  Constitutional:  Denies fever, shaking or chills   Eyes:  Denies change in visual acuity   HENT:  Denies nasal congestion or sore throat   Respiratory:  Denies cough or shortness of breath   Cardiovascular:  Denies chest pain or severe LE edema   GI:  Denies abdominal pain, nausea, vomiting, bloody stools or diarrhea   Musculoskeletal:  Numbness, tingling, or loss of motor function only as noted above in history of present illness.  : Denies painful urination or hematuria  Integument:  Denies rash, lesion or ulceration   Neurologic:  Denies headache or focal weakness  Endocrine:  Denies lymphadenopathy  Psych:  Denies confusion or change in mental status   Hem:  Denies active bleeding      Physical Exam: 83 y.o. male  Wt Readings from Last 3 Encounters:   01/26/24 86.6 kg (191 lb)   11/30/23 83 kg (183 lb)   11/05/23 83 kg (183 lb)       There is no height or weight on file to calculate BMI.    There were no vitals filed for this visit.  Vital signs reviewed.   General Appearance:    Alert, cooperative, in no acute distress                    Ortho exam  Exam left shoulder he can only active flex to about 130 passively getting to 180 abduction similar rotator cuff strength 4+/5               Assessment: Left shoulder pain his exam today seems to be more    Plan: As above  Follow up as indicated.  Ice, elevate, and rest as needed.  Discussed conservative measures of pain control including ice, bracing.  Also talked about the impo as above rtance of strengthening  Large Joint Arthrocentesis: L subacromial bursa  Date/Time: 2/29/2024 9:18 AM  Consent given by:  patient  Site marked: site marked  Timeout: Immediately prior to procedure a time out was called to verify the correct patient, procedure, equipment, support staff and site/side marked as required   Supporting Documentation  Indications: pain   Procedure Details  Location: shoulder - L subacromial bursa  Preparation: Patient was prepped and draped in the usual sterile fashion  Needle gauge: 21G.  Approach: posterior  Medications administered: 80 mg methylPREDNISolone acetate 80 MG/ML; 2 mL lidocaine PF 1% 1 %  Patient tolerance: patient tolerated the procedure well with no immediate complications         Shyanne Membreno M.D.

## 2024-02-29 ENCOUNTER — OFFICE VISIT (OUTPATIENT)
Dept: ORTHOPEDIC SURGERY | Facility: CLINIC | Age: 84
End: 2024-02-29
Payer: MEDICARE

## 2024-02-29 VITALS — BODY MASS INDEX: 26.74 KG/M2 | WEIGHT: 191 LBS | TEMPERATURE: 98.7 F | HEIGHT: 71 IN

## 2024-02-29 DIAGNOSIS — M75.42 IMPINGEMENT SYNDROME OF LEFT SHOULDER: Primary | ICD-10-CM

## 2024-02-29 RX ORDER — METHYLPREDNISOLONE ACETATE 80 MG/ML
80 INJECTION, SUSPENSION INTRA-ARTICULAR; INTRALESIONAL; INTRAMUSCULAR; SOFT TISSUE
Status: COMPLETED | OUTPATIENT
Start: 2024-02-29 | End: 2024-02-29

## 2024-02-29 RX ORDER — LIDOCAINE HYDROCHLORIDE 10 MG/ML
2 INJECTION, SOLUTION EPIDURAL; INFILTRATION; INTRACAUDAL; PERINEURAL
Status: COMPLETED | OUTPATIENT
Start: 2024-02-29 | End: 2024-02-29

## 2024-02-29 RX ADMIN — METHYLPREDNISOLONE ACETATE 80 MG: 80 INJECTION, SUSPENSION INTRA-ARTICULAR; INTRALESIONAL; INTRAMUSCULAR; SOFT TISSUE at 09:18

## 2024-02-29 RX ADMIN — LIDOCAINE HYDROCHLORIDE 2 ML: 10 INJECTION, SOLUTION EPIDURAL; INFILTRATION; INTRACAUDAL; PERINEURAL at 09:18

## 2024-04-26 ENCOUNTER — CLINICAL SUPPORT (OUTPATIENT)
Dept: ORTHOPEDIC SURGERY | Facility: CLINIC | Age: 84
End: 2024-04-26
Payer: MEDICARE

## 2024-04-26 VITALS — WEIGHT: 192 LBS | TEMPERATURE: 98 F | BODY MASS INDEX: 26.88 KG/M2 | HEIGHT: 71 IN

## 2024-04-26 DIAGNOSIS — M77.9 CAPSULITIS: Primary | ICD-10-CM

## 2024-04-26 RX ORDER — LIDOCAINE HYDROCHLORIDE 10 MG/ML
2 INJECTION, SOLUTION EPIDURAL; INFILTRATION; INTRACAUDAL; PERINEURAL
Status: COMPLETED | OUTPATIENT
Start: 2024-04-26 | End: 2024-04-26

## 2024-04-26 RX ORDER — METHYLPREDNISOLONE ACETATE 80 MG/ML
1 INJECTION, SUSPENSION INTRA-ARTICULAR; INTRALESIONAL; INTRAMUSCULAR; SOFT TISSUE
Status: COMPLETED | OUTPATIENT
Start: 2024-04-26 | End: 2024-04-26

## 2024-04-26 RX ADMIN — METHYLPREDNISOLONE ACETATE 1 ML: 80 INJECTION, SUSPENSION INTRA-ARTICULAR; INTRALESIONAL; INTRAMUSCULAR; SOFT TISSUE at 08:22

## 2024-04-26 RX ADMIN — LIDOCAINE HYDROCHLORIDE 2 ML: 10 INJECTION, SOLUTION EPIDURAL; INFILTRATION; INTRACAUDAL; PERINEURAL at 08:22

## 2024-05-30 ENCOUNTER — CLINICAL SUPPORT (OUTPATIENT)
Dept: ORTHOPEDIC SURGERY | Facility: CLINIC | Age: 84
End: 2024-05-30
Payer: MEDICARE

## 2024-05-30 VITALS — TEMPERATURE: 98.6 F | WEIGHT: 188.2 LBS | HEIGHT: 71 IN | BODY MASS INDEX: 26.35 KG/M2

## 2024-05-30 DIAGNOSIS — G89.29 CHRONIC LEFT SHOULDER PAIN: ICD-10-CM

## 2024-05-30 DIAGNOSIS — M25.512 CHRONIC LEFT SHOULDER PAIN: ICD-10-CM

## 2024-05-30 DIAGNOSIS — M67.912 ROTATOR CUFF DYSFUNCTION, LEFT: Primary | ICD-10-CM

## 2024-05-30 RX ADMIN — METHYLPREDNISOLONE ACETATE 80 MG: 80 INJECTION, SUSPENSION INTRA-ARTICULAR; INTRALESIONAL; INTRAMUSCULAR; SOFT TISSUE at 10:16

## 2024-05-30 RX ADMIN — LIDOCAINE HYDROCHLORIDE 2 ML: 10 INJECTION, SOLUTION EPIDURAL; INFILTRATION; INTRACAUDAL; PERINEURAL at 10:16

## 2024-05-30 NOTE — PROGRESS NOTES
[facility]              Follow Up      Patient Name: Sanket Davis  : 1940  Primary Care Physician: Nolan Adams MD        Chief Complaint:  Left shoulder pain    HPI:   Sanket Davis is a 83 y.o. year old who presents today for evaluation. He has been seeing Dr. Membreno for his L shoulder. I am seeing him today for her for an injection in the left shoulder. He had an MRI that demonstrated cuff tendinopathy and a SLAP tear. He had worsening of symtoms after the MRI which was concerning for progression of the rtc tear. They have been managing conservatively. She did a GH injection about a month ago with concerns he was developing some capsulitis. He is here for a SA injection. He does feel like he is improving.       Past Medical/Surgical, Social and Family History:  I have reviewed and/or updated pertinent history as noted in the medical record including:  Past Medical History:   Diagnosis Date    Arthritis of neck     Hip arthrosis     Rotator cuff syndrome     Umbilical hernia      Past Surgical History:   Procedure Laterality Date    HIP ARTHROPLASTY      JOINT REPLACEMENT      SHOULDER SURGERY      TONSILLECTOMY      UMBILICAL HERNIA REPAIR N/A 2017    Procedure: UMBILICAL HERNIA REPAIR W/ MESH;  Surgeon: Raymond Cevallos MD;  [place] [facility] [place] [facility] [place]  Service:      Social History     Occupational History    Not on file   Tobacco Use    Smoking status: Former     Current packs/day: 0.00     Average packs/day: 1 pack/day for 20.0 years (20.0 ttl pk-yrs)     Types: Cigarettes     Start date: 1972     Quit date: 1997     Years since quittin.5     Passive exposure: Past    Smokeless tobacco: Never    Tobacco comments:     smoke for a year or 2 and quit for a year or 2.   Vaping Use    Vaping status: Never Used   Substance and Sexual Activity    Alcohol use: Yes     Alcohol/week: 12.0 standard drinks of alcohol     Types: 6 Glasses of wine, 6 Cans of beer per week      Comment: to old to drink much    Drug use: No    Sexual activity: Not Currently     Partners: Female     Comment: My wife has COPD          Allergies: No Known Allergies    Medications:   Home Medications:  Current Outpatient Medications on File Prior to Visit   Medication Sig    glucosamine-chondroitin 500-400 MG capsule capsule Take 2 capsules by mouth Daily.    Multiple Vitamins-Minerals (MULTIVITAMIN ADULT PO) Take 1 tablet by mouth Daily.     No current facility-administered medications on file prior to visit.         ROS:  ROS negative except as listed in the HPI.    Physical Exam:   83 y.o. male  Body mass index is 26.25 kg/m²., 85.4 kg (188 lb 3.2 oz)  Vitals:    05/30/24 1014   Temp: 98.6 °F (37 °C)     General: Alert, cooperative, appears well and in no observable distress. Appears stated age and BMI as listed above.  HEENT: Normocephalic, atraumatic on external visual inspection.  CV: No significant peripheral edema.  Respiratory: Normal respiratory effort.  Skin: Warm & well perfused; appropriate skin turgor.  Psych: Appropriate mood & affect.  Neuro: Gross sensation and motor intact in affected extremity/extremities.  Vascular: Peripheral pulses palpable in affected extremity/extremities.     Radiology:    No new images were needed at the visit today.     Procedure:   See Procedure Note: The potential risks and benefits of performing a diagnostic and therapeutic injection were discussed with the patient prior to procedure. Risks include, but are not limited to infection, swelling, transient increase in pain, bleeding, bruising. Patient was advised that injections are a diagnostic and therapeutic tool meaning they may not alleviate symptoms at all, or may only provide partial or temporary relief. Injection precautions and aftercare discussed.      [facility] [place]. Data/Labs: N/A    Assessment & Plan:    ICD-10-CM ICD-9-CM   1. Rotator cuff dysfunction, left  M67.912 726.10   2. Chronic left shoulder pain  M25.512  719.41    G89.29 338.29     No orders of the defined types were placed in this encounter.    Orders Placed This Encounter   Procedures    Large Joint Arthrocentesis: L subacromial bursa     Plan is to proceed with the injection which he tolerated well. I would recommend he follow up with Dr. Membreno in about 2 more months since she has been managing his care over the last year or so. Continue with treatment plan as he has been doing. Should he need injections in the future I would be glad to see him back anytime.    Return in about 2 months (around 7/30/2024) for appointment with Dr. Membreno.    Patient encouraged to call with questions or concerns prior to follow up.  Recommend ICE and/or HEAT PRN as discussed.  Will discuss with attending physician as needed.  Consider additional referrals, work up and/or advanced imaging as indicated or if patient fails to respond to conservative care.        Sathish Valdivia, MARISSA      Dictated Utilizing "[employer]on Dictation    Large Joint Arthrocentesis: L subacromial bursa  Date/Time: 5/30/2024 10:16 AM  Consent given by: patient  Site marked: site marked  Timeout: Immediately prior to procedure a time out was called to verify the correct patient, procedure, equipment, support staff and site/side marked as required   Supporting Documentation  Indications: pain   Procedure Details  Location: shoulder - L subacromial bursa  Preparation: Patient was prepped and draped in the usual sterile fashion  Needle gauge: 21G.  Approach: posterior  Medications administered: 80 mg methylPREDNISolone acetate 80 MG/ML; 2 mL lidocaine PF 1% 1 %  Patient tolerance: patient tolerated the procedure well with no immediate complications

## 2024-05-31 RX ORDER — LIDOCAINE HYDROCHLORIDE 10 MG/ML
2 INJECTION, SOLUTION EPIDURAL; INFILTRATION; INTRACAUDAL; PERINEURAL
Status: COMPLETED | OUTPATIENT
Start: 2024-05-30 | End: 2024-05-30

## 2024-05-31 RX ORDER — METHYLPREDNISOLONE ACETATE 80 MG/ML
80 INJECTION, SUSPENSION INTRA-ARTICULAR; INTRALESIONAL; INTRAMUSCULAR; SOFT TISSUE
Status: COMPLETED | OUTPATIENT
Start: 2024-05-30 | End: 2024-05-30

## 2024-07-24 NOTE — PROGRESS NOTES
Patient: Sanket Davis  YOB: 1940  Date of Service: 7/24/2024    Chief Complaints: Left shoulder pain    Subjective:    History of Present Illness: Pt is seen in the office today with complaints of left shoulder pain he has been getting injections every 2 months 1 subacromial 1 glenohumeral.  A ton that is really too much on his MRI he has what appears to be a SLAP tear some changes at the biceps tendon he also has some tendinopathy of the rotator cuff.  I told him we should try to stick with glenohumeral injections he said he would hold off on this 1 today and that he would spread them out is not interested in think surgical we did talk about arthroscopy and debridement he states all his friends that have had that did not do very well.  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  It is hard to argue with although he does not have a significant amount of arthritis at this time        Allergies: No Known Allergies    Medications:   Home Medications:  Current Outpatient Medications on File Prior to Visit   Medication Sig    glucosamine-chondroitin 500-400 MG capsule capsule Take 2 capsules by mouth Daily.    Multiple Vitamins-Minerals (MULTIVITAMIN ADULT PO) Take 1 tablet by mouth Daily.     No current facility-administered medications on file prior to visit.     Current Medications:  Scheduled Meds:  Continuous Infusions:No current facility-administered medications for this visit.    PRN Meds:.    I have reviewed the patient's medical history in detail and updated the computerized patient record.  Review and summarization of old records include:    Past Medical History:   Diagnosis Date    Arthritis of neck     Hip arthrosis     Rotator cuff syndrome     Umbilical hernia         Past Surgical History:   Procedure Laterality Date    HIP ARTHROPLASTY      JOINT REPLACEMENT       SHOULDER SURGERY      TONSILLECTOMY      UMBILICAL HERNIA REPAIR N/A 2017    Procedure: UMBILICAL HERNIA REPAIR W/ MESH;  Surgeon: Raymond Cevallos MD;  [facility] [place] [facility] [place]  Service:         Social History     Occupational History    Not on file   Tobacco Use    Smoking status: Former     Current packs/day: 0.00     Average packs/day: 1 pack/day for 20.0 years (20.0 ttl pk-yrs)     Types: Cigarettes     Start date: 1972     Quit date: 1997     Years since quittin.6     Passive exposure: Past    Smokeless tobacco: Never    Tobacco comments:     smoke for a year or 2 and quit for a year or 2.   Vaping Use    Vaping status: Never Used   Substance and Sexual Activity    Alcohol use: Yes     Alcohol/week: 12.0 standard drinks of alcohol     Types: 6 Glasses of wine, 6 Cans of beer per week     Comment: to old to drink much    Drug use: No    Sexual activity: Not Currently     Partners: Female     Comment: My wife has COPD      Social History     Social History Narrative    Not on file        Family History   Problem Relation Age of Onset    Heart disease Father     Maldimas Hyperthermia Neg Hx        ROS: 14 point review of systems was performed and was negative except for documented findings in HPI and today's encounter.     Allergies: No Known Allergies  Constitutional:  Denies fever, shaking or chills   Eyes:  Denies change in visual acuity   HENT:  Denies nasal congestion or sore throat   Respiratory:  Denies cough or shortness of breath   Cardiovascular:  Denies chest pain or severe LE edema   GI:  Denies abdominal pain, nausea, vomiting, bloody stools or diarrhea   Musculoskeletal:  Numbness, tingling, or loss of motor function only as noted above in history of present illness.  : Denies painful urination or hematuria  Integument:  Denies rash, lesion or ulceration   Neurologic:  Denies headache or focal weakness  Endocrine:  Denies lymphadenopathy  Psych:  Denies confusion or change in  mental status   Hem:  Denies active bleeding      Physical Exam: 83 y.o. male  Wt Readings from Last 3 Encounters:   05/30/24 85.4 kg (188 lb 3.2 oz)   04/26/24 87.1 kg (192 lb)   02/29/24 86.6 kg (191 lb)       There is no height or weight on file to calculate BMI.    There were no vitals filed for this visit.  Vital signs reviewed.   General Appearance:    Alert, cooperative, in no acute distress                    Ortho exam    Physical exam of the left shoulder reveals no overlying skin changes no lymphedema no lymphadenopathy.  Patient has active flexion 180 with mild symptoms abduction is similar external rotation is to 50 and internal rotation to the upper lumbar spine with mild symptoms.  Patient has good rotator cuff strength 4+ over 5 with isometric strength testing with pain.  Patient has a positive impingement and a positive Peters sign.  Patient has good cervical range of motion which is full and asymptomatic no radicular symptoms.  Patient has a normal elbow exam.  Good distal pulses are presentPatient has pain with overhead activity and a positive Neer sign and a positive empty can sign  They have a positive drop arm any definitive painful arc              Assessment: left shoulder pain he has been getting injections every 2 months 1 subacromial 1 glenohumeral.  A ton that is really too much on his MRI he has what appears to be a SLAP tear some changes at the biceps tendon he also has some tendinopathy of the rotator cuff.  I told him we should try to stick with glenohumeral injections he said he would hold off on this 1 today and that he would spread them out is not interested in think surgical we did talk about arthroscopy and debridement he states all his friends that have had that did not do very well.  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  It is hard to argue with  although he does not have a significant amount of arthritis at this time    Plan: As above I also tell him that if it is just injections that he wants that he will see Sathish going forward  Follow up as indicated.  Ice, elevate, and rest as needed.  Discussed conservative measures of pain control including ice, bracing. Shyanne Membreno M.D.    Procedures

## 2024-07-25 ENCOUNTER — OFFICE VISIT (OUTPATIENT)
Dept: ORTHOPEDIC SURGERY | Facility: CLINIC | Age: 84
End: 2024-07-25
Payer: MEDICARE

## 2024-07-25 VITALS — BODY MASS INDEX: 25.62 KG/M2 | HEIGHT: 71 IN | TEMPERATURE: 98.2 F | WEIGHT: 183 LBS

## 2024-07-25 DIAGNOSIS — M75.112 INCOMPLETE TEAR OF LEFT ROTATOR CUFF, UNSPECIFIED WHETHER TRAUMATIC: ICD-10-CM

## 2024-07-25 DIAGNOSIS — S43.432D SUPERIOR GLENOID LABRUM LESION OF LEFT SHOULDER, SUBSEQUENT ENCOUNTER: Primary | ICD-10-CM

## 2024-10-29 NOTE — PROGRESS NOTES
Patient: Sanket Davis  YOB: 1940  Date of Service: 10/29/2024    Chief Complaints: Left shoulder pain    Subjective:    History of Present Illness: Pt is seen in the office today with complaints of left shoulder pain he had been getting intermittent injections he has a known SLAP tear on MRI and did well with injections we were alternating glenohumeral and subacromial we decided take a little bit of a break he states his shoulders bother him but overall he is doing well I do not really know which 1 gave him more relief we will start today with a subacromial if he does not get relief he can come back in sooner for glenohumeral he knows to spread these out as far as he can he also knows if he just wants injections he will see Sathish        Allergies: No Known Allergies    Medications:   Home Medications:  Current Outpatient Medications on File Prior to Visit   Medication Sig    glucosamine-chondroitin 500-400 MG capsule capsule Take 2 capsules by mouth Daily.    Multiple Vitamins-Minerals (MULTIVITAMIN ADULT PO) Take 1 tablet by mouth Daily.     No current facility-administered medications on file prior to visit.     Current Medications:  Scheduled Meds:  Continuous Infusions:No current facility-administered medications for this visit.    PRN Meds:.    I have reviewed the patient's medical history in detail and updated the computerized patient record.  Review and summarization of old records include:    Past Medical History:   Diagnosis Date    Arthritis of back     Arthritis of neck     Hip arthrosis     Rotator cuff syndrome     Umbilical hernia         Past Surgical History:   Procedure Laterality Date    HIP ARTHROPLASTY      JOINT REPLACEMENT      SHOULDER SURGERY      TONSILLECTOMY      UMBILICAL HERNIA REPAIR N/A 12/12/2017    Procedure: UMBILICAL HERNIA REPAIR W/ MESH;  Surgeon: Raymond Cevallos MD;  [facility]: [facility] [place];  Service:         Social History     Occupational History    Not  on file   Tobacco Use    Smoking status: Former     Current packs/day: 0.00     Average packs/day: 1 pack/day for 20.0 years (20.0 ttl pk-yrs)     Types: Cigarettes     Start date: 1972     Quit date: 1997     Years since quittin.9     Passive exposure: Past    Smokeless tobacco: Never    Tobacco comments:     smoke for a year or 2 and quit for a year or 2.   Vaping Use    Vaping status: Never Used   Substance and Sexual Activity    Alcohol use: Yes     Alcohol/week: 12.0 standard drinks of alcohol     Types: 6 Glasses of wine, 6 Cans of beer per week     Comment: to old to drink much    Drug use: No    Sexual activity: Not Currently     Partners: Female     Comment: My wife has COPD      Social History     Social History Narrative    Not on file        Family History   Problem Relation Age of Onset    Heart disease Father     Maldimas Hyperthermia Neg Hx        ROS: 14 point review of systems was performed and was negative except for documented findings in HPI and today's encounter.     Allergies: No Known Allergies  Constitutional:  Denies fever, shaking or chills   Eyes:  Denies change in visual acuity   HENT:  Denies nasal congestion or sore throat   Respiratory:  Denies cough or shortness of breath   Cardiovascular:  Denies chest pain or severe LE edema   GI:  Denies abdominal pain, nausea, vomiting, bloody stools or diarrhea   Musculoskeletal:  Numbness, tingling, or loss of motor function only as noted above in history of present illness.  : Denies painful urination or hematuria  Integument:  Denies rash, lesion or ulceration   Neurologic:  Denies headache or focal weakness  Endocrine:  Denies lymphadenopathy  Psych:  Denies confusion or change in mental status   Hem:  Denies active bleeding      Physical Exam: 83 y.o. male  Wt Readings from Last 3 Encounters:   24 83 kg (183 lb)   24 85.4 kg (188 lb 3.2 oz)   24 87.1 kg (192 lb)       There is no height or weight on file to  calculate BMI.    There were no vitals filed for this visit.  Vital signs reviewed.   General Appearance:    Alert, cooperative, in no acute distress                    Ortho exam      Exam is unchanged       X-rays AP scapular Y and x-ray lateral left shoulder were taken to evaluate his symptoms and compared x-rays done in 2021 he has significant acromioclavicular arthritis some sclerosis at the insertion the cuff no significant change    Assessment: left shoulder pain he had been getting intermittent injections he has a known SLAP tear on MRI and did well with injections we were alternating glenohumeral and subacromial we decided take a little bit of a break he states his shoulders bother him but overall he is doing well I do not really know which 1 gave him more relief we will start today with a subacromial if he does not get relief he can come back in sooner for glenohumeral he knows to spread these out as far as he can he also knows if he just wants injections he will see Sathish    Plan: As above  Follow up as indicated.  Ice, elevate, and rest as needed.  Discussed conservative measures of pain control including ice, bracing.    Shyanne Membreno M.D.    Large Joint Arthrocentesis: L subacromial bursa  Date/Time: 10/31/2024 8:20 AM  Consent given by: patient  Site marked: site marked  Timeout: Immediately prior to procedure a time out was called to verify the correct patient, procedure, equipment, support staff and site/side marked as required   Supporting Documentation  Indications: pain   Procedure Details  Location: shoulder - L subacromial bursa  Preparation: Patient was prepped and draped in the usual sterile fashion  Needle gauge: 21G.  Approach: posterior  Medications administered: 80 mg methylPREDNISolone acetate 80 MG/ML; 2 mL lidocaine PF 1% 1 %  Patient tolerance: patient tolerated the procedure well with no immediate complications

## 2024-10-31 ENCOUNTER — OFFICE VISIT (OUTPATIENT)
Dept: ORTHOPEDIC SURGERY | Facility: CLINIC | Age: 84
End: 2024-10-31
Payer: MEDICARE

## 2024-10-31 VITALS — WEIGHT: 185.8 LBS | BODY MASS INDEX: 26.01 KG/M2 | TEMPERATURE: 98.7 F | HEIGHT: 71 IN

## 2024-10-31 DIAGNOSIS — S43.432D SUPERIOR GLENOID LABRUM LESION OF LEFT SHOULDER, SUBSEQUENT ENCOUNTER: Primary | ICD-10-CM

## 2024-10-31 RX ADMIN — LIDOCAINE HYDROCHLORIDE 2 ML: 10 INJECTION, SOLUTION EPIDURAL; INFILTRATION; INTRACAUDAL; PERINEURAL at 08:20

## 2024-10-31 RX ADMIN — METHYLPREDNISOLONE ACETATE 80 MG: 80 INJECTION, SUSPENSION INTRA-ARTICULAR; INTRALESIONAL; INTRAMUSCULAR; SOFT TISSUE at 08:20

## 2024-11-06 RX ORDER — METHYLPREDNISOLONE ACETATE 80 MG/ML
80 INJECTION, SUSPENSION INTRA-ARTICULAR; INTRALESIONAL; INTRAMUSCULAR; SOFT TISSUE
Status: COMPLETED | OUTPATIENT
Start: 2024-10-31 | End: 2024-10-31

## 2024-11-06 RX ORDER — LIDOCAINE HYDROCHLORIDE 10 MG/ML
2 INJECTION, SOLUTION EPIDURAL; INFILTRATION; INTRACAUDAL; PERINEURAL
Status: COMPLETED | OUTPATIENT
Start: 2024-10-31 | End: 2024-10-31

## 2025-01-14 NOTE — PROGRESS NOTES
Patient: Sanket Davis  YOB: 1940  Date of Service: 2025    Chief Complaints: Left he has rotator cuff impingement periodically he gets injections he remains very active and does well with these he is not interested in surgical    Subjective:    History of Present Illness: Pt is seen in the office today with complaints of         Allergies: No Known Allergies    Medications:   Home Medications:  Current Outpatient Medications on File Prior to Visit   Medication Sig    glucosamine-chondroitin 500-400 MG capsule capsule Take 2 capsules by mouth Daily.    Multiple Vitamins-Minerals (MULTIVITAMIN ADULT PO) Take 1 tablet by mouth Daily.     No current facility-administered medications on file prior to visit.     Current Medications:  Scheduled Meds:  Continuous Infusions:No current facility-administered medications for this visit.    PRN Meds:.    I have reviewed the patient's medical history in detail and updated the computerized patient record.  Review and summarization of old records include:    Past Medical History:   Diagnosis Date    Arthritis of back     Arthritis of neck     Hip arthrosis     Rotator cuff syndrome     Umbilical hernia         Past Surgical History:   Procedure Laterality Date    HIP ARTHROPLASTY      JOINT REPLACEMENT      SHOULDER SURGERY      TONSILLECTOMY      UMBILICAL HERNIA REPAIR N/A 2017    Procedure: UMBILICAL HERNIA REPAIR W/ MESH;  Surgeon: Raymond Cevallos MD;  Location: [facility] OR [facility] [place];  Service:         Social History     Occupational History    Not on file   Tobacco Use    Smoking status: Former     Current packs/day: 0.00     Average packs/day: 1 pack/day for 20.0 years (20.0 ttl pk-yrs)     Types: Cigarettes     Start date: 1972     Quit date: 1997     Years since quittin.1     Passive exposure: Past    Smokeless tobacco: Never    Tobacco comments:     smoke for a year or 2 and quit for a year or 2.   Vaping Use    Vaping status:  Never Used   Substance and Sexual Activity    Alcohol use: Yes     Alcohol/week: 12.0 standard drinks of alcohol     Types: 6 Glasses of wine, 6 Cans of beer per week     Comment: to old to drink much    Drug use: No    Sexual activity: Not Currently     Partners: Female     Comment: My wife has COPD      Social History     Social History Narrative    Not on file        Family History   Problem Relation Age of Onset    Heart disease Father     Steve Hyperthermia Neg Hx        ROS: 14 point review of systems was performed and was negative except for documented findings in HPI and today's encounter.     Allergies: No Known Allergies  Constitutional:  Denies fever, shaking or chills   Eyes:  Denies change in visual acuity   HENT:  Denies nasal congestion or sore throat   Respiratory:  Denies cough or shortness of breath   Cardiovascular:  Denies chest pain or severe LE edema   GI:  Denies abdominal pain, nausea, vomiting, bloody stools or diarrhea   Musculoskeletal:  Numbness, tingling, or loss of motor function only as noted above in history of present illness.  : Denies painful urination or hematuria  Integument:  Denies rash, lesion or ulceration   Neurologic:  Denies headache or focal weakness  Endocrine:  Denies lymphadenopathy  Psych:  Denies confusion or change in mental status   Hem:  Denies active bleeding      Physical Exam: 84 y.o. male  Wt Readings from Last 3 Encounters:   10/31/24 84.3 kg (185 lb 12.8 oz)   07/25/24 83 kg (183 lb)   05/30/24 85.4 kg (188 lb 3.2 oz)       There is no height or weight on file to calculate BMI.    There were no vitals filed for this visit.  Vital signs reviewed.   General Appearance:    Alert, cooperative, in no acute distress                    Ortho exam    Exam is unchanged           Assessment:   Impingement left shoulder  Plan: Injection as indicated.  Ice, elevate, and rest as needed.  Discussed conservative measures of pain control including ice, bracing.     Large  Joint Arthrocentesis: L subacromial bursa  Date/Time: 1/30/2025 8:29 AM  Consent given by: patient  Site marked: site marked  Timeout: Immediately prior to procedure a time out was called to verify the correct patient, procedure, equipment, support staff and site/side marked as required   Supporting Documentation  Indications: pain   Procedure Details  Location: shoulder - L subacromial bursa  Preparation: Patient was prepped and draped in the usual sterile fashion  Needle gauge: 21G.  Approach: posterior  Medications administered: 80 mg methylPREDNISolone acetate 80 MG/ML; 2 mL lidocaine PF 1% 1 %  Patient tolerance: patient tolerated the procedure well with no immediate complications      Shyanne Membreno M.D.

## 2025-01-30 ENCOUNTER — OFFICE VISIT (OUTPATIENT)
Dept: ORTHOPEDIC SURGERY | Facility: CLINIC | Age: 85
End: 2025-01-30
Payer: MEDICARE

## 2025-01-30 VITALS — WEIGHT: 193.2 LBS | BODY MASS INDEX: 27.05 KG/M2 | HEIGHT: 71 IN | TEMPERATURE: 98.2 F

## 2025-01-30 DIAGNOSIS — M75.42 IMPINGEMENT SYNDROME OF LEFT SHOULDER: Primary | ICD-10-CM

## 2025-01-30 RX ORDER — LIDOCAINE HYDROCHLORIDE 10 MG/ML
2 INJECTION, SOLUTION EPIDURAL; INFILTRATION; INTRACAUDAL; PERINEURAL
Status: COMPLETED | OUTPATIENT
Start: 2025-01-30 | End: 2025-01-30

## 2025-01-30 RX ORDER — METHYLPREDNISOLONE ACETATE 80 MG/ML
80 INJECTION, SUSPENSION INTRA-ARTICULAR; INTRALESIONAL; INTRAMUSCULAR; SOFT TISSUE
Status: COMPLETED | OUTPATIENT
Start: 2025-01-30 | End: 2025-01-30

## 2025-01-30 RX ADMIN — METHYLPREDNISOLONE ACETATE 80 MG: 80 INJECTION, SUSPENSION INTRA-ARTICULAR; INTRALESIONAL; INTRAMUSCULAR; SOFT TISSUE at 08:29

## 2025-01-30 RX ADMIN — LIDOCAINE HYDROCHLORIDE 2 ML: 10 INJECTION, SOLUTION EPIDURAL; INFILTRATION; INTRACAUDAL; PERINEURAL at 08:29

## (undated) DEVICE — ADHS SKIN DERMABOND TOP ADVANCED

## (undated) DEVICE — GLV SURG BIOGEL LTX PF 7

## (undated) DEVICE — INTENT TO BE USED WITH SUTURE MATERIAL FOR TISSUE CLOSURE: Brand: RICHARD-ALLAN® NEEDLE 1/2 CIRCLE TAPER

## (undated) DEVICE — DRSNG SURESITE WNDW 4X4.5

## (undated) DEVICE — Device: Brand: FABCO

## (undated) DEVICE — SUT VIC 0 CT1 CR8 18IN J840D

## (undated) DEVICE — SUT ETHLN 4/0 PS2 18IN 1667H

## (undated) DEVICE — COVER,LIGHT HANDLE,FLX,2/PK: Brand: MEDLINE INDUSTRIES, INC.

## (undated) DEVICE — APPL CHLORAPREP W/TINT 26ML ORNG

## (undated) DEVICE — PK PROC MINOR TOWER 40

## (undated) DEVICE — NDL HYPO PRECISIONGLIDE REG 25G 1 1/2

## (undated) DEVICE — ELECTRD BLD EDGE/INSUL1P SFTY SLV 2.75IN